# Patient Record
Sex: FEMALE | Race: BLACK OR AFRICAN AMERICAN | Employment: UNEMPLOYED | URBAN - METROPOLITAN AREA
[De-identification: names, ages, dates, MRNs, and addresses within clinical notes are randomized per-mention and may not be internally consistent; named-entity substitution may affect disease eponyms.]

---

## 2017-01-14 ENCOUNTER — ED HISTORICAL/CONVERTED ENCOUNTER (OUTPATIENT)
Dept: OTHER | Age: 37
End: 2017-01-14

## 2017-05-19 ENCOUNTER — ED HISTORICAL/CONVERTED ENCOUNTER (OUTPATIENT)
Dept: OTHER | Age: 37
End: 2017-05-19

## 2017-07-06 ENCOUNTER — OP HISTORICAL/CONVERTED ENCOUNTER (OUTPATIENT)
Dept: OTHER | Age: 37
End: 2017-07-06

## 2018-01-07 ENCOUNTER — ED HISTORICAL/CONVERTED ENCOUNTER (OUTPATIENT)
Dept: OTHER | Age: 38
End: 2018-01-07

## 2018-01-22 ENCOUNTER — OP HISTORICAL/CONVERTED ENCOUNTER (OUTPATIENT)
Dept: OTHER | Age: 38
End: 2018-01-22

## 2019-02-21 ENCOUNTER — HOSPITAL ENCOUNTER (OUTPATIENT)
Dept: PREADMISSION TESTING | Age: 39
Discharge: HOME | End: 2019-02-21
Payer: COMMERCIAL

## 2019-02-21 VITALS
WEIGHT: 260.38 LBS | HEIGHT: 70 IN | HEART RATE: 93 BPM | SYSTOLIC BLOOD PRESSURE: 142 MMHG | DIASTOLIC BLOOD PRESSURE: 81 MMHG | BODY MASS INDEX: 37.28 KG/M2 | RESPIRATION RATE: 20 BRPM | OXYGEN SATURATION: 98 % | TEMPERATURE: 97.4 F

## 2019-02-21 LAB
ABO + RH BLD: NORMAL
ANION GAP SERPL CALC-SCNC: 6 MMOL/L (ref 5–15)
BLOOD GROUP ANTIBODIES SERPL: NORMAL
BUN SERPL-MCNC: 5 MG/DL (ref 6–20)
BUN/CREAT SERPL: 6 (ref 12–20)
CALCIUM SERPL-MCNC: 8.9 MG/DL (ref 8.5–10.1)
CHLORIDE SERPL-SCNC: 106 MMOL/L (ref 97–108)
CO2 SERPL-SCNC: 29 MMOL/L (ref 21–32)
CREAT SERPL-MCNC: 0.87 MG/DL (ref 0.55–1.02)
ERYTHROCYTE [DISTWIDTH] IN BLOOD BY AUTOMATED COUNT: 14.5 % (ref 11.5–14.5)
GLUCOSE SERPL-MCNC: 322 MG/DL (ref 65–100)
HCG UR QL: NEGATIVE
HCT VFR BLD AUTO: 39.8 % (ref 35–47)
HGB BLD-MCNC: 12.5 G/DL (ref 11.5–16)
MCH RBC QN AUTO: 26.7 PG (ref 26–34)
MCHC RBC AUTO-ENTMCNC: 31.4 G/DL (ref 30–36.5)
MCV RBC AUTO: 84.9 FL (ref 80–99)
NRBC # BLD: 0 K/UL (ref 0–0.01)
NRBC BLD-RTO: 0 PER 100 WBC
PLATELET # BLD AUTO: 229 K/UL (ref 150–400)
PMV BLD AUTO: 10.6 FL (ref 8.9–12.9)
POTASSIUM SERPL-SCNC: 4.3 MMOL/L (ref 3.5–5.1)
RBC # BLD AUTO: 4.69 M/UL (ref 3.8–5.2)
SODIUM SERPL-SCNC: 141 MMOL/L (ref 136–145)
SPECIMEN EXP DATE BLD: NORMAL
WBC # BLD AUTO: 5.2 K/UL (ref 3.6–11)

## 2019-02-21 PROCEDURE — 85027 COMPLETE CBC AUTOMATED: CPT

## 2019-02-21 PROCEDURE — 36415 COLL VENOUS BLD VENIPUNCTURE: CPT

## 2019-02-21 PROCEDURE — 86900 BLOOD TYPING SEROLOGIC ABO: CPT

## 2019-02-21 PROCEDURE — 80048 BASIC METABOLIC PNL TOTAL CA: CPT

## 2019-02-21 PROCEDURE — 81025 URINE PREGNANCY TEST: CPT

## 2019-02-21 RX ORDER — CYCLOBENZAPRINE HCL 10 MG
10 TABLET ORAL
COMMUNITY
End: 2019-05-10 | Stop reason: ALTCHOICE

## 2019-02-21 RX ORDER — BISMUTH SUBSALICYLATE 262 MG
1 TABLET,CHEWABLE ORAL DAILY
COMMUNITY

## 2019-02-21 RX ORDER — GLIPIZIDE 5 MG/1
5 TABLET, FILM COATED, EXTENDED RELEASE ORAL DAILY
COMMUNITY
End: 2019-05-10 | Stop reason: ALTCHOICE

## 2019-02-21 NOTE — PERIOP NOTES
[address]                  
[address] [facility], [address] [facility] MAIN OR                                  [phone] MAIN PRE OP                          [phone]                                                                                AMBULATORY PRE OP          [phone] PRE-ADMISSION TESTING    21  Surgery Date:   03/13/2019 Is surgery arrival time given by surgeon? NO If [facility] staff will call you between 3 and 7pm the day before your surgery with your arrival time. (If your surgery is on a Monday, we will call you the Friday before.) Call [phone] after 7pm Monday-Friday if you did not receive your arrival time. INSTRUCTIONS BEFORE YOUR SURGERY When You 
Arrive Arrive at the 2nd [address] [facility] on the day of your surgery Have your insurance card, photo ID, and any copayment (if needed) Food 
 and  
Drink NO food or drink after midnight the night before surgery This means NO water, gum, mints, coffee, juice, etc. 
No alcohol (beer, wine, liquor) 24 hours before and after surgery Medications to TAKE Morning of Surgery MEDICATIONS TO TAKE THE MORNING OF SURGERY WITH A SIP OF WATER:  
? Cymbalta, lopressor, prilosec Medications To 
STOP      7 days before surgery ? Non-Steroidal anti-inflammatory Drugs (NSAID's): for example, Ibuprofen (Advil, Motrin), Naproxen (Aleve) ? Aspirin, if taking for pain ? Herbal supplements, vitamins, and fish oil 
? Other: 
(Pain medications not listed above, including Tylenol may be taken) Blood Thinners ? If you take  Aspirin, Plavix, Coumadin, or any blood-thinning or anti-blood clot medicine, talk to the doctor who prescribed the medications for pre-operative instructions. Bathing Clothing Jewelry Valuables ?   If you shower the morning of surgery, please do not apply anything to your skin (lotions, powders, deodorant, or makeup, especially mascara) ? Follow all special bath instructions (for total joint replacement, spine and bowel surgeries) ? Do not shave or trim anywhere 24 hours before surgery ? Wear your hair loose or down; no pony-tails, buns, or metal hair clips ? Wear loose, comfortable, clean clothes ? Wear glasses instead of contacts ? Leave money, valuables, and jewelry, including body piercings, at home Going Home       or Spending the Night ? SAME-DAY SURGERY: You must have a responsible adult drive you home and stay with you 24 hours after surgery ? ADMITS: If your doctor is keeping you into the hospital after surgery, leave personal belongings/luggage in your car until you have a hospital room number. Hospital discharge time is 12 noon Drivers must be here before 12 noon unless you are told differently Special Instructions Free  Parking Follow all instructions so your surgery wont be cancelled. Please, be on time. If a situation occurs and you are delayed the day of surgery, call   [phone]. If your physical condition changes (like a fever, cold, flu, etc.) call your surgeon. The patient was contacted  in person. Home medication reviewed and verified during PAT appointment. The patient verbalizes understanding of all instructions and does not  need reinforcement.

## 2019-03-12 ENCOUNTER — ANESTHESIA EVENT (OUTPATIENT)
Dept: SURGERY | Age: 39
End: 2019-03-12
Payer: MEDICAID

## 2019-03-13 ENCOUNTER — HOSPITAL ENCOUNTER (OUTPATIENT)
Age: 39
Setting detail: OBSERVATION
Discharge: HOME | End: 2019-03-14
Attending: STUDENT IN AN ORGANIZED HEALTH CARE EDUCATION/TRAINING PROGRAM | Admitting: STUDENT IN AN ORGANIZED HEALTH CARE EDUCATION/TRAINING PROGRAM
Payer: MEDICAID

## 2019-03-13 ENCOUNTER — ANESTHESIA (OUTPATIENT)
Dept: SURGERY | Age: 39
End: 2019-03-13
Payer: MEDICAID

## 2019-03-13 DIAGNOSIS — E89.40 SURGICAL MENOPAUSE: ICD-10-CM

## 2019-03-13 DIAGNOSIS — G89.18 POSTOPERATIVE PAIN: Primary | ICD-10-CM

## 2019-03-13 PROBLEM — R10.2 PELVIC PAIN: Status: ACTIVE | Noted: 2019-03-13

## 2019-03-13 PROBLEM — N93.9 ABNORMAL UTERINE BLEEDING: Status: ACTIVE | Noted: 2019-03-13

## 2019-03-13 LAB
ABO + RH BLD: NORMAL
BLOOD GROUP ANTIBODIES SERPL: NORMAL
GLUCOSE BLD STRIP.AUTO-MCNC: 147 MG/DL (ref 65–100)
GLUCOSE BLD STRIP.AUTO-MCNC: 186 MG/DL (ref 65–100)
GLUCOSE BLD STRIP.AUTO-MCNC: 198 MG/DL (ref 65–100)
GLUCOSE BLD STRIP.AUTO-MCNC: 243 MG/DL (ref 65–100)
GLUCOSE BLD STRIP.AUTO-MCNC: 293 MG/DL (ref 65–100)
GLUCOSE BLD STRIP.AUTO-MCNC: 334 MG/DL (ref 65–100)
HCG UR QL: NEGATIVE
SERVICE CMNT-IMP: ABNORMAL
SPECIMEN EXP DATE BLD: NORMAL

## 2019-03-13 PROCEDURE — 77030018836 HC SOL IRR NACL ICUM -A: Performed by: STUDENT IN AN ORGANIZED HEALTH CARE EDUCATION/TRAINING PROGRAM

## 2019-03-13 PROCEDURE — 77030026438 HC STYL ET INTUB CARD -A: Performed by: NURSE ANESTHETIST, CERTIFIED REGISTERED

## 2019-03-13 PROCEDURE — 74011250637 HC RX REV CODE- 250/637: Performed by: INTERNAL MEDICINE

## 2019-03-13 PROCEDURE — 74011000250 HC RX REV CODE- 250: Performed by: STUDENT IN AN ORGANIZED HEALTH CARE EDUCATION/TRAINING PROGRAM

## 2019-03-13 PROCEDURE — 76210000017 HC OR PH I REC 1.5 TO 2 HR: Performed by: STUDENT IN AN ORGANIZED HEALTH CARE EDUCATION/TRAINING PROGRAM

## 2019-03-13 PROCEDURE — 76060000035 HC ANESTHESIA 2 TO 2.5 HR: Performed by: STUDENT IN AN ORGANIZED HEALTH CARE EDUCATION/TRAINING PROGRAM

## 2019-03-13 PROCEDURE — 88307 TISSUE EXAM BY PATHOLOGIST: CPT

## 2019-03-13 PROCEDURE — 77030035029 HC NDL INSUF VERES DISP COVD -B: Performed by: STUDENT IN AN ORGANIZED HEALTH CARE EDUCATION/TRAINING PROGRAM

## 2019-03-13 PROCEDURE — 77030035048 HC TRCR ENDOSC OPTCL COVD -B: Performed by: STUDENT IN AN ORGANIZED HEALTH CARE EDUCATION/TRAINING PROGRAM

## 2019-03-13 PROCEDURE — 77030020782 HC GWN BAIR PAWS FLX 3M -B

## 2019-03-13 PROCEDURE — 74011250636 HC RX REV CODE- 250/636: Performed by: STUDENT IN AN ORGANIZED HEALTH CARE EDUCATION/TRAINING PROGRAM

## 2019-03-13 PROCEDURE — 77030035045 HC TRCR ENDOSC VRSPRT BLDLSS COVD -B: Performed by: STUDENT IN AN ORGANIZED HEALTH CARE EDUCATION/TRAINING PROGRAM

## 2019-03-13 PROCEDURE — 74011250636 HC RX REV CODE- 250/636

## 2019-03-13 PROCEDURE — 74011000250 HC RX REV CODE- 250

## 2019-03-13 PROCEDURE — 77030034850: Performed by: STUDENT IN AN ORGANIZED HEALTH CARE EDUCATION/TRAINING PROGRAM

## 2019-03-13 PROCEDURE — 99218 HC RM OBSERVATION: CPT

## 2019-03-13 PROCEDURE — 77030002933 HC SUT MCRYL J&J -A: Performed by: STUDENT IN AN ORGANIZED HEALTH CARE EDUCATION/TRAINING PROGRAM

## 2019-03-13 PROCEDURE — 77030037892: Performed by: STUDENT IN AN ORGANIZED HEALTH CARE EDUCATION/TRAINING PROGRAM

## 2019-03-13 PROCEDURE — 77030032490 HC SLV COMPR SCD KNE COVD -B: Performed by: STUDENT IN AN ORGANIZED HEALTH CARE EDUCATION/TRAINING PROGRAM

## 2019-03-13 PROCEDURE — 77030031139 HC SUT VCRL2 J&J -A: Performed by: STUDENT IN AN ORGANIZED HEALTH CARE EDUCATION/TRAINING PROGRAM

## 2019-03-13 PROCEDURE — 77030008756 HC TU IRR SUC STRY -B: Performed by: STUDENT IN AN ORGANIZED HEALTH CARE EDUCATION/TRAINING PROGRAM

## 2019-03-13 PROCEDURE — 77030018778 HC MANIP UTER VCAR CNMD -B: Performed by: STUDENT IN AN ORGANIZED HEALTH CARE EDUCATION/TRAINING PROGRAM

## 2019-03-13 PROCEDURE — 74011250636 HC RX REV CODE- 250/636: Performed by: ANESTHESIOLOGY

## 2019-03-13 PROCEDURE — 77030019908 HC STETH ESOPH SIMS -A: Performed by: NURSE ANESTHETIST, CERTIFIED REGISTERED

## 2019-03-13 PROCEDURE — 77030008771 HC TU NG SALEM SUMP -A: Performed by: NURSE ANESTHETIST, CERTIFIED REGISTERED

## 2019-03-13 PROCEDURE — 74011250637 HC RX REV CODE- 250/637: Performed by: STUDENT IN AN ORGANIZED HEALTH CARE EDUCATION/TRAINING PROGRAM

## 2019-03-13 PROCEDURE — 77030035051: Performed by: STUDENT IN AN ORGANIZED HEALTH CARE EDUCATION/TRAINING PROGRAM

## 2019-03-13 PROCEDURE — 81025 URINE PREGNANCY TEST: CPT

## 2019-03-13 PROCEDURE — 74011636637 HC RX REV CODE- 636/637: Performed by: STUDENT IN AN ORGANIZED HEALTH CARE EDUCATION/TRAINING PROGRAM

## 2019-03-13 PROCEDURE — 77030013079 HC BLNKT BAIR HGGR 3M -A: Performed by: NURSE ANESTHETIST, CERTIFIED REGISTERED

## 2019-03-13 PROCEDURE — 76010000131 HC OR TIME 2 TO 2.5 HR: Performed by: STUDENT IN AN ORGANIZED HEALTH CARE EDUCATION/TRAINING PROGRAM

## 2019-03-13 PROCEDURE — 77030011640 HC PAD GRND REM COVD -A: Performed by: STUDENT IN AN ORGANIZED HEALTH CARE EDUCATION/TRAINING PROGRAM

## 2019-03-13 PROCEDURE — 77030009848 HC PASSR SUT SET COOP -C: Performed by: STUDENT IN AN ORGANIZED HEALTH CARE EDUCATION/TRAINING PROGRAM

## 2019-03-13 PROCEDURE — 77030008684 HC TU ET CUF COVD -B: Performed by: NURSE ANESTHETIST, CERTIFIED REGISTERED

## 2019-03-13 PROCEDURE — 82962 GLUCOSE BLOOD TEST: CPT

## 2019-03-13 PROCEDURE — 74011636637 HC RX REV CODE- 636/637: Performed by: ANESTHESIOLOGY

## 2019-03-13 PROCEDURE — 77030039266 HC ADH SKN EXOFIN S2SG -A: Performed by: STUDENT IN AN ORGANIZED HEALTH CARE EDUCATION/TRAINING PROGRAM

## 2019-03-13 PROCEDURE — 77030034154 HC SHR COAG HARM ACE J&J -F: Performed by: STUDENT IN AN ORGANIZED HEALTH CARE EDUCATION/TRAINING PROGRAM

## 2019-03-13 PROCEDURE — 36415 COLL VENOUS BLD VENIPUNCTURE: CPT

## 2019-03-13 PROCEDURE — 86900 BLOOD TYPING SEROLOGIC ABO: CPT

## 2019-03-13 RX ORDER — ATORVASTATIN CALCIUM 20 MG/1
20 TABLET, FILM COATED ORAL
Status: DISCONTINUED | OUTPATIENT
Start: 2019-03-13 | End: 2019-03-14 | Stop reason: HOSPADM

## 2019-03-13 RX ORDER — SODIUM CHLORIDE 0.9 % (FLUSH) 0.9 %
5-40 SYRINGE (ML) INJECTION AS NEEDED
Status: DISCONTINUED | OUTPATIENT
Start: 2019-03-13 | End: 2019-03-13 | Stop reason: HOSPADM

## 2019-03-13 RX ORDER — MAGNESIUM SULFATE 100 %
4 CRYSTALS MISCELLANEOUS AS NEEDED
Status: DISCONTINUED | OUTPATIENT
Start: 2019-03-13 | End: 2019-03-14 | Stop reason: HOSPADM

## 2019-03-13 RX ORDER — ONDANSETRON 2 MG/ML
4 INJECTION INTRAMUSCULAR; INTRAVENOUS AS NEEDED
Status: DISCONTINUED | OUTPATIENT
Start: 2019-03-13 | End: 2019-03-13 | Stop reason: HOSPADM

## 2019-03-13 RX ORDER — METOPROLOL TARTRATE 50 MG/1
100 TABLET ORAL 2 TIMES DAILY
Status: DISCONTINUED | OUTPATIENT
Start: 2019-03-13 | End: 2019-03-14 | Stop reason: HOSPADM

## 2019-03-13 RX ORDER — MIDAZOLAM HYDROCHLORIDE 1 MG/ML
INJECTION, SOLUTION INTRAMUSCULAR; INTRAVENOUS AS NEEDED
Status: DISCONTINUED | OUTPATIENT
Start: 2019-03-13 | End: 2019-03-13 | Stop reason: HOSPADM

## 2019-03-13 RX ORDER — GLIPIZIDE 5 MG/1
5 TABLET, FILM COATED, EXTENDED RELEASE ORAL DAILY
Status: DISCONTINUED | OUTPATIENT
Start: 2019-03-13 | End: 2019-03-14 | Stop reason: HOSPADM

## 2019-03-13 RX ORDER — CEFAZOLIN SODIUM/WATER 2 G/20 ML
2 SYRINGE (ML) INTRAVENOUS ONCE
Status: COMPLETED | OUTPATIENT
Start: 2019-03-13 | End: 2019-03-13

## 2019-03-13 RX ORDER — GLIPIZIDE 5 MG/1
5 TABLET, FILM COATED, EXTENDED RELEASE ORAL DAILY
Status: DISCONTINUED | OUTPATIENT
Start: 2019-03-14 | End: 2019-03-13

## 2019-03-13 RX ORDER — LIDOCAINE HYDROCHLORIDE 20 MG/ML
INJECTION, SOLUTION EPIDURAL; INFILTRATION; INTRACAUDAL; PERINEURAL AS NEEDED
Status: DISCONTINUED | OUTPATIENT
Start: 2019-03-13 | End: 2019-03-13 | Stop reason: HOSPADM

## 2019-03-13 RX ORDER — ONDANSETRON 2 MG/ML
INJECTION INTRAMUSCULAR; INTRAVENOUS AS NEEDED
Status: DISCONTINUED | OUTPATIENT
Start: 2019-03-13 | End: 2019-03-13 | Stop reason: HOSPADM

## 2019-03-13 RX ORDER — FAMOTIDINE 10 MG/ML
INJECTION INTRAVENOUS AS NEEDED
Status: DISCONTINUED | OUTPATIENT
Start: 2019-03-13 | End: 2019-03-13 | Stop reason: HOSPADM

## 2019-03-13 RX ORDER — SODIUM CHLORIDE, SODIUM LACTATE, POTASSIUM CHLORIDE, CALCIUM CHLORIDE 600; 310; 30; 20 MG/100ML; MG/100ML; MG/100ML; MG/100ML
100 INJECTION, SOLUTION INTRAVENOUS CONTINUOUS
Status: DISCONTINUED | OUTPATIENT
Start: 2019-03-13 | End: 2019-03-13 | Stop reason: HOSPADM

## 2019-03-13 RX ORDER — FAMOTIDINE 20 MG/1
40 TABLET, FILM COATED ORAL DAILY
Status: DISCONTINUED | OUTPATIENT
Start: 2019-03-14 | End: 2019-03-14 | Stop reason: HOSPADM

## 2019-03-13 RX ORDER — HYDROMORPHONE HYDROCHLORIDE 1 MG/ML
.25-1 INJECTION, SOLUTION INTRAMUSCULAR; INTRAVENOUS; SUBCUTANEOUS
Status: DISCONTINUED | OUTPATIENT
Start: 2019-03-13 | End: 2019-03-13 | Stop reason: HOSPADM

## 2019-03-13 RX ORDER — BUPIVACAINE HYDROCHLORIDE 5 MG/ML
INJECTION, SOLUTION EPIDURAL; INTRACAUDAL AS NEEDED
Status: DISCONTINUED | OUTPATIENT
Start: 2019-03-13 | End: 2019-03-13 | Stop reason: HOSPADM

## 2019-03-13 RX ORDER — ONDANSETRON 2 MG/ML
4 INJECTION INTRAMUSCULAR; INTRAVENOUS
Status: DISCONTINUED | OUTPATIENT
Start: 2019-03-13 | End: 2019-03-14 | Stop reason: HOSPADM

## 2019-03-13 RX ORDER — HYDROMORPHONE HYDROCHLORIDE 2 MG/ML
1 INJECTION, SOLUTION INTRAMUSCULAR; INTRAVENOUS; SUBCUTANEOUS
Status: DISCONTINUED | OUTPATIENT
Start: 2019-03-13 | End: 2019-03-14 | Stop reason: HOSPADM

## 2019-03-13 RX ORDER — SODIUM CHLORIDE, SODIUM LACTATE, POTASSIUM CHLORIDE, CALCIUM CHLORIDE 600; 310; 30; 20 MG/100ML; MG/100ML; MG/100ML; MG/100ML
75 INJECTION, SOLUTION INTRAVENOUS CONTINUOUS
Status: DISCONTINUED | OUTPATIENT
Start: 2019-03-13 | End: 2019-03-13 | Stop reason: HOSPADM

## 2019-03-13 RX ORDER — DIPHENHYDRAMINE HYDROCHLORIDE 50 MG/ML
12.5 INJECTION, SOLUTION INTRAMUSCULAR; INTRAVENOUS AS NEEDED
Status: DISCONTINUED | OUTPATIENT
Start: 2019-03-13 | End: 2019-03-13 | Stop reason: HOSPADM

## 2019-03-13 RX ORDER — KETOROLAC TROMETHAMINE 30 MG/ML
INJECTION, SOLUTION INTRAMUSCULAR; INTRAVENOUS AS NEEDED
Status: DISCONTINUED | OUTPATIENT
Start: 2019-03-13 | End: 2019-03-13 | Stop reason: HOSPADM

## 2019-03-13 RX ORDER — SODIUM CHLORIDE 0.9 % (FLUSH) 0.9 %
5-40 SYRINGE (ML) INJECTION EVERY 8 HOURS
Status: DISCONTINUED | OUTPATIENT
Start: 2019-03-13 | End: 2019-03-13 | Stop reason: HOSPADM

## 2019-03-13 RX ORDER — SODIUM CHLORIDE, SODIUM LACTATE, POTASSIUM CHLORIDE, CALCIUM CHLORIDE 600; 310; 30; 20 MG/100ML; MG/100ML; MG/100ML; MG/100ML
125 INJECTION, SOLUTION INTRAVENOUS CONTINUOUS
Status: DISCONTINUED | OUTPATIENT
Start: 2019-03-13 | End: 2019-03-13 | Stop reason: HOSPADM

## 2019-03-13 RX ORDER — SODIUM CHLORIDE, SODIUM LACTATE, POTASSIUM CHLORIDE, CALCIUM CHLORIDE 600; 310; 30; 20 MG/100ML; MG/100ML; MG/100ML; MG/100ML
125 INJECTION, SOLUTION INTRAVENOUS CONTINUOUS
Status: DISCONTINUED | OUTPATIENT
Start: 2019-03-13 | End: 2019-03-14 | Stop reason: HOSPADM

## 2019-03-13 RX ORDER — DULOXETIN HYDROCHLORIDE 30 MG/1
60 CAPSULE, DELAYED RELEASE ORAL 2 TIMES DAILY
Status: DISCONTINUED | OUTPATIENT
Start: 2019-03-13 | End: 2019-03-14 | Stop reason: HOSPADM

## 2019-03-13 RX ORDER — GLYCOPYRROLATE 0.2 MG/ML
INJECTION INTRAMUSCULAR; INTRAVENOUS AS NEEDED
Status: DISCONTINUED | OUTPATIENT
Start: 2019-03-13 | End: 2019-03-13 | Stop reason: HOSPADM

## 2019-03-13 RX ORDER — HYDROCODONE BITARTRATE AND ACETAMINOPHEN 7.5; 325 MG/1; MG/1
1 TABLET ORAL
Status: DISCONTINUED | OUTPATIENT
Start: 2019-03-13 | End: 2019-03-14 | Stop reason: HOSPADM

## 2019-03-13 RX ORDER — SUCCINYLCHOLINE CHLORIDE 20 MG/ML
INJECTION INTRAMUSCULAR; INTRAVENOUS AS NEEDED
Status: DISCONTINUED | OUTPATIENT
Start: 2019-03-13 | End: 2019-03-13 | Stop reason: HOSPADM

## 2019-03-13 RX ORDER — INSULIN LISPRO 100 [IU]/ML
INJECTION, SOLUTION INTRAVENOUS; SUBCUTANEOUS
Status: DISCONTINUED | OUTPATIENT
Start: 2019-03-13 | End: 2019-03-14 | Stop reason: HOSPADM

## 2019-03-13 RX ORDER — NEOSTIGMINE METHYLSULFATE 1 MG/ML
INJECTION INTRAVENOUS AS NEEDED
Status: DISCONTINUED | OUTPATIENT
Start: 2019-03-13 | End: 2019-03-13 | Stop reason: HOSPADM

## 2019-03-13 RX ORDER — ROCURONIUM BROMIDE 10 MG/ML
INJECTION, SOLUTION INTRAVENOUS AS NEEDED
Status: DISCONTINUED | OUTPATIENT
Start: 2019-03-13 | End: 2019-03-13 | Stop reason: HOSPADM

## 2019-03-13 RX ORDER — SODIUM CHLORIDE 0.9 % (FLUSH) 0.9 %
5-40 SYRINGE (ML) INJECTION AS NEEDED
Status: DISCONTINUED | OUTPATIENT
Start: 2019-03-13 | End: 2019-03-14

## 2019-03-13 RX ORDER — DULOXETIN HYDROCHLORIDE 30 MG/1
60 CAPSULE, DELAYED RELEASE ORAL DAILY
Status: DISCONTINUED | OUTPATIENT
Start: 2019-03-13 | End: 2019-03-13

## 2019-03-13 RX ORDER — DIPHENHYDRAMINE HYDROCHLORIDE 50 MG/ML
12.5 INJECTION, SOLUTION INTRAMUSCULAR; INTRAVENOUS
Status: DISCONTINUED | OUTPATIENT
Start: 2019-03-13 | End: 2019-03-14 | Stop reason: HOSPADM

## 2019-03-13 RX ORDER — PROPOFOL 10 MG/ML
INJECTION, EMULSION INTRAVENOUS AS NEEDED
Status: DISCONTINUED | OUTPATIENT
Start: 2019-03-13 | End: 2019-03-13 | Stop reason: HOSPADM

## 2019-03-13 RX ORDER — SODIUM CHLORIDE 0.9 % (FLUSH) 0.9 %
5-40 SYRINGE (ML) INJECTION EVERY 8 HOURS
Status: DISCONTINUED | OUTPATIENT
Start: 2019-03-13 | End: 2019-03-14

## 2019-03-13 RX ORDER — LIDOCAINE HYDROCHLORIDE 10 MG/ML
0.1 INJECTION, SOLUTION EPIDURAL; INFILTRATION; INTRACAUDAL; PERINEURAL AS NEEDED
Status: DISCONTINUED | OUTPATIENT
Start: 2019-03-13 | End: 2019-03-13 | Stop reason: HOSPADM

## 2019-03-13 RX ORDER — FENTANYL CITRATE 50 UG/ML
INJECTION, SOLUTION INTRAMUSCULAR; INTRAVENOUS AS NEEDED
Status: DISCONTINUED | OUTPATIENT
Start: 2019-03-13 | End: 2019-03-13 | Stop reason: HOSPADM

## 2019-03-13 RX ORDER — KETOROLAC TROMETHAMINE 30 MG/ML
30 INJECTION, SOLUTION INTRAMUSCULAR; INTRAVENOUS EVERY 8 HOURS
Status: DISCONTINUED | OUTPATIENT
Start: 2019-03-13 | End: 2019-03-14 | Stop reason: HOSPADM

## 2019-03-13 RX ORDER — DOCUSATE SODIUM 100 MG/1
100 CAPSULE, LIQUID FILLED ORAL 2 TIMES DAILY
Status: DISCONTINUED | OUTPATIENT
Start: 2019-03-13 | End: 2019-03-14 | Stop reason: HOSPADM

## 2019-03-13 RX ORDER — DEXTROSE 50 % IN WATER (D50W) INTRAVENOUS SYRINGE
12.5-25 AS NEEDED
Status: DISCONTINUED | OUTPATIENT
Start: 2019-03-13 | End: 2019-03-14 | Stop reason: HOSPADM

## 2019-03-13 RX ADMIN — FAMOTIDINE 20 MG: 10 INJECTION INTRAVENOUS at 07:42

## 2019-03-13 RX ADMIN — MIDAZOLAM HYDROCHLORIDE 3 MG: 1 INJECTION, SOLUTION INTRAMUSCULAR; INTRAVENOUS at 07:39

## 2019-03-13 RX ADMIN — ROCURONIUM BROMIDE 10 MG: 10 INJECTION, SOLUTION INTRAVENOUS at 07:49

## 2019-03-13 RX ADMIN — INSULIN HUMAN 10 UNITS: 100 INJECTION, SOLUTION PARENTERAL at 10:50

## 2019-03-13 RX ADMIN — ATORVASTATIN CALCIUM 20 MG: 20 TABLET, FILM COATED ORAL at 21:08

## 2019-03-13 RX ADMIN — FENTANYL CITRATE 50 MCG: 50 INJECTION, SOLUTION INTRAMUSCULAR; INTRAVENOUS at 08:10

## 2019-03-13 RX ADMIN — GLIPIZIDE 5 MG: 5 TABLET, FILM COATED, EXTENDED RELEASE ORAL at 14:21

## 2019-03-13 RX ADMIN — SODIUM CHLORIDE, POTASSIUM CHLORIDE, SODIUM LACTATE AND CALCIUM CHLORIDE: 600; 310; 30; 20 INJECTION, SOLUTION INTRAVENOUS at 09:00

## 2019-03-13 RX ADMIN — SODIUM CHLORIDE, POTASSIUM CHLORIDE, SODIUM LACTATE AND CALCIUM CHLORIDE: 600; 310; 30; 20 INJECTION, SOLUTION INTRAVENOUS at 07:42

## 2019-03-13 RX ADMIN — FENTANYL CITRATE 50 MCG: 50 INJECTION, SOLUTION INTRAMUSCULAR; INTRAVENOUS at 09:44

## 2019-03-13 RX ADMIN — DULOXETINE HYDROCHLORIDE 60 MG: 30 CAPSULE, DELAYED RELEASE ORAL at 18:18

## 2019-03-13 RX ADMIN — SODIUM CHLORIDE, SODIUM LACTATE, POTASSIUM CHLORIDE, AND CALCIUM CHLORIDE 125 ML/HR: 600; 310; 30; 20 INJECTION, SOLUTION INTRAVENOUS at 18:41

## 2019-03-13 RX ADMIN — ROCURONIUM BROMIDE 10 MG: 10 INJECTION, SOLUTION INTRAVENOUS at 08:34

## 2019-03-13 RX ADMIN — SUCCINYLCHOLINE CHLORIDE 140 MG: 20 INJECTION INTRAMUSCULAR; INTRAVENOUS at 07:48

## 2019-03-13 RX ADMIN — INSULIN LISPRO 2 UNITS: 100 INJECTION, SOLUTION INTRAVENOUS; SUBCUTANEOUS at 17:30

## 2019-03-13 RX ADMIN — LIDOCAINE HYDROCHLORIDE 60 MG: 20 INJECTION, SOLUTION EPIDURAL; INFILTRATION; INTRACAUDAL; PERINEURAL at 07:49

## 2019-03-13 RX ADMIN — Medication 2 G: at 08:00

## 2019-03-13 RX ADMIN — HYDROMORPHONE HYDROCHLORIDE 1 MG: 1 INJECTION, SOLUTION INTRAMUSCULAR; INTRAVENOUS; SUBCUTANEOUS at 10:33

## 2019-03-13 RX ADMIN — FENTANYL CITRATE 50 MCG: 50 INJECTION, SOLUTION INTRAMUSCULAR; INTRAVENOUS at 07:42

## 2019-03-13 RX ADMIN — FENTANYL CITRATE 50 MCG: 50 INJECTION, SOLUTION INTRAMUSCULAR; INTRAVENOUS at 09:22

## 2019-03-13 RX ADMIN — GLYCOPYRROLATE 0.5 MG: 0.2 INJECTION INTRAMUSCULAR; INTRAVENOUS at 09:43

## 2019-03-13 RX ADMIN — DOCUSATE SODIUM 100 MG: 100 CAPSULE, LIQUID FILLED ORAL at 18:19

## 2019-03-13 RX ADMIN — ONDANSETRON 4 MG: 2 INJECTION INTRAMUSCULAR; INTRAVENOUS at 09:43

## 2019-03-13 RX ADMIN — FENTANYL CITRATE 50 MCG: 50 INJECTION, SOLUTION INTRAMUSCULAR; INTRAVENOUS at 08:19

## 2019-03-13 RX ADMIN — KETOROLAC TROMETHAMINE 30 MG: 30 INJECTION, SOLUTION INTRAMUSCULAR; INTRAVENOUS at 18:29

## 2019-03-13 RX ADMIN — PROPOFOL 200 MG: 10 INJECTION, EMULSION INTRAVENOUS at 07:48

## 2019-03-13 RX ADMIN — KETOROLAC TROMETHAMINE 30 MG: 30 INJECTION, SOLUTION INTRAMUSCULAR; INTRAVENOUS at 09:43

## 2019-03-13 RX ADMIN — ROCURONIUM BROMIDE 20 MG: 10 INJECTION, SOLUTION INTRAVENOUS at 08:07

## 2019-03-13 RX ADMIN — METOPROLOL TARTRATE 100 MG: 50 TABLET ORAL at 18:19

## 2019-03-13 RX ADMIN — NEOSTIGMINE METHYLSULFATE 3 MG: 1 INJECTION INTRAVENOUS at 09:43

## 2019-03-13 NOTE — CONSULTS
Consult History and Physical Exam    NAME:  Dima Farrell   :   1980   MRN:  [id number]     PCP:  Che Arriola MD   Referring: Rene Tolliver DO     Date/Time:  3/13/2019         Subjective:   REASON FOR CONSULT: diabetes    CHIEF COMPLAINT: I feel good     HISTORY OF PRESENT ILLNESS:     Ms. Renu Rodas is a 34 y.o. with h/o hld, dm, svt who presents for hysterectomy. Pt is doing well post procedure, still somewhat sleepy from anesthesia. She states she is 'borderline' for diabetes and was recently placed back on medication. She did not take her glipizide this morning.  States her BG is usually well controlled      Past Medical History:   Diagnosis Date    AR (allergic rhinitis)     Autoimmune disease ([address]     fibromyalgia    Carpal tunnel syndrome     Diabetes ([address]     Eczema     Endometriosis     Essential hypertension     GERD (gastroesophageal reflux disease)     Heartburn     Hyperlipidemia     Ill-defined condition     peripheral neuropathy    Ill-defined condition     cyst on spine    PCOS (polycystic ovarian syndrome)     Psychiatric disorder     depression    PUD (peptic ulcer disease)     Scoliosis         Past Surgical History:   Procedure Laterality Date    BIOPSY OF KIDNEY,OPEN EXPOS      HX ORTHOPAEDIC  1994    Spinal fusion    HX OTHER SURGICAL      wisdom teeth extraction       Social History     Tobacco Use    Smoking status: Never Smoker    Smokeless tobacco: Never Used   Substance Use Topics    Alcohol use: No        Family History   Problem Relation Age of Onset    Asthma Sister     Cancer Maternal Grandmother         breast    Diabetes Mother     Hypertension Father     High Cholesterol Father         Allergies   Allergen Reactions    Atenolol Other (comments)     Muscle weakness      Bactrim [Sulfamethoxazole-Trimethoprim] Hives    Lisinopril Hives    Omnicef [Cefdinir] Diarrhea    Toprol Xl [Metoprolol Succinate] Other (comments) nightmares        Prior to Admission medications    Medication Sig Start Date End Date Taking? Authorizing Provider   metoprolol tartrate (LOPRESSOR) 100 mg IR tablet Take  by mouth two (2) times a day. Yes Provider, Historical   atorvastatin (LIPITOR) 20 mg tablet Take 20 mg by mouth nightly. Yes Provider, Historical   glipiZIDE SR (GLUCOTROL XL) 5 mg CR tablet Take 5 mg by mouth daily. Provider, Historical   cyclobenzaprine (FLEXERIL) 10 mg tablet Take 10 mg by mouth nightly. Provider, Historical   multivitamin (ONE DAILY) tablet Take 1 Tab by mouth daily. Provider, Historical   DULoxetine (CYMBALTA) 60 mg capsule Take 60 mg by mouth daily. Provider, Historical   magnesium 250 mg tab Take 250 mg by mouth daily. Provider, Historical   topiramate (TOPAMAX) 50 mg tablet Take 100 mg by mouth two (2) times a day. Provider, Historical   traMADol-acetaminophen (ULTRACET) 37.5-325 mg per tablet Take 2 Tabs by mouth every six (6) hours as needed for Pain. Provider, Historical   CALCIUM CITRATE-VITAMIN D3 PO Take 1 Tab by mouth two (2) times a day. Vitamin d 3 500 mg and calcium 400 mg    Provider, Historical   Alpha Lipoic Acid 200 mg tab Take 1,200 mg by mouth daily. Provider, Historical   triamcinolone acetonide (KENALOG) 0.1 % topical cream Apply  to affected area two (2) times a day. use thin layer    Provider, Historical   Milnacipran (SAVELLA) 50 mg tablet Take 50 mg by mouth two (2) times a day. Provider, Historical   omeprazole (PRILOSEC) 20 mg capsule Take 40 mg by mouth daily. Provider, Historical   senna (SENOKOT) 8.6 mg tablet Take 1-2 Tabs by mouth as needed. Provider, Historical   acetaminophen (TYLENOL) 500 mg tablet Take 500 mg by mouth every six (6) hours as needed for Pain. Provider, Historical   loratadine (CLARITIN) 10 mg tablet Take 10 mg by mouth daily.     Provider, Historical   fluticasone (FLONASE) 50 mcg/actuation nasal spray 2 Sprays by Both Nostrils route once.    Provider, Historical   tretinoin (RETIN-A) 0.025 % topical cream Apply  to affected area nightly. Provider, Historical   clindamycin (CLEOCIN T) 1 % lotion Apply  to affected area two (2) times a day. use thin film on affected area    Provider, Historical         Review of Systems:  (bold if positive, if negative)    Gen:  Eyes:  ENT:  CVS:  Pulm:  GI:    :    MS:  Skin:  Psych:  Endo:    Hem:  Renal:    Neuro:            Objective:      VITALS:    Vital signs reviewed; most recent are:    Visit Vitals  /64 (BP 1 Location: Left arm, BP Patient Position: At rest)   Pulse 97   Temp 97.9 °F (36.6 °C)   Resp 18   Ht 5' 10\" (1.778 m)   Wt 117.5 kg (259 lb 0.7 oz)   SpO2 98%   BMI 37.17 kg/m²     SpO2 Readings from Last 6 Encounters:   03/13/19 98%   02/21/19 98%   12/02/16 98%   10/21/16 98%   03/16/16 98%   05/27/12 99%    O2 Flow Rate (L/min): 2 l/min       Intake/Output Summary (Last 24 hours) at 3/13/2019 1344  Last data filed at 3/13/2019 1126  Gross per 24 hour   Intake 1900 ml   Output 385 ml   Net 1515 ml            Exam:     Physical Exam:    Gen:  Well-developed, well-nourished, in no acute distress  HEENT:  Pink conjunctivae, PERRL, hearing intact to voice, moist mucous membranes  Neck:  Supple, without masses, thyroid non-tender  Resp:  No accessory muscle use, clear breath sounds without wheezes rales or rhonchi  Card:  No murmurs, normal S1, S2 without thrills, bruits or peripheral edema  Abd:  Soft, non-tender, non-distended, normoactive bowel sounds are present, no palpable organomegaly  Lymph:  No cervical adenopathy  Musc:  No cyanosis or clubbing  Skin:  No rashes or ulcers, skin turgor is good  Neuro:  Cranial nerves 3-12 are grossly intact,  strength is 5/5 bilaterally, dorsi / plantarflexion strength is 5/5 bilaterally, follows commands appropriately  Psych:  Alert with good insight.   Oriented to person, place, and time       Labs:    No results for input(s): WBC, HGB, HCT, PLT, HGBEXT, HCTEXT, PLTEXT in the last 72 hours. No results for input(s): NA, K, CL, CO2, GLU, BUN, CREA, CA, MG, PHOS, ALB, TBIL, SGOT, ALT in the last 72 hours. No components found for: GLPOC    No results for input(s): INR in the last 72 hours. No lab exists for component: INREXT       Assessment/Plan:       Type 2 diabetes mellitus: BG elevated today likely due to missed dose of glipizide. Order now dose as pt will be eating this evening. Continue SSI. Send A1c    H/o SVT: resume metoprolol    Hyperlipidemia: resume lipitor      Abnormal uterine bleeding: sp hysterectomy.  OBGYN is primary team and with manage dvt ppx, pt/ot, pain control          Total time spent with patient: 48 [facility] discussed with: Patient    Discussed:  Care Plan             ___________________________________________________    Attending Physician: Dow Sandhoff, MD

## 2019-03-13 NOTE — PROGRESS NOTES
Bedside shift change report given to Jami Pardo (oncoming nurse) by Jessica Day RN (offgoing nurse). Report included the following information SBAR, Kardex, Intake/Output, MAR and Recent Results.

## 2019-03-13 NOTE — BRIEF OP NOTE
BRIEF OPERATIVE NOTE    Date of Procedure: 3/13/2019   Preoperative Diagnosis: PELVIC PAIN  ENDOMETRIOSIS  Postoperative Diagnosis: PELVIC PAIN  ENDOMETRIOSIS    Procedure(s):  TOTAL LAPAROSCOPIC HYSTERECTOMY, BILATERAL SALPINGOOPHERECTOMY, LYSIS OF ADHESIONS. Surgeon(s) and Role:     * Ruben Chaudhary DO - Primary     * Kenyon Vaughn MD - Assisting         Surgical Assistant: None    Surgical Staff:  Circ-1: Rhett Leone RN  Circ-2: Juliana Barbour  Scrub Tech-1: Jenaro AQUINO  Scrub Tech-Relief: Desiree Dunne  Event Time In Time Out   Incision Start 6589    Incision Close 0954      Anesthesia: General   Estimated Blood Loss: 200cc, UOP 125cc, IVF 1500cc crystalloid   Specimens:   ID Type Source Tests Collected by Time Destination   1 : uterus, cervix, bilateral tubes and ovaries Fresh Uterus  Ruben Chaudhary DO 3/13/2019 0934 Pathology    Uterine weight 195gm   Findings: Small sharply retroverted uterus, normal appearing ovaries and fallopian tubes with filshie clips in place bilaterally. Adhesions of the sigmoid colon and omentum to the left abdominal wall and pelvic sidewall, some of which were taken down. Possible endometriosis implants along the left IP. No other significant scar tissue or endometriosis present. Normal appearing bowel, gallbladder, appendix, and liver border.     Complications: None   Implants: * No implants in log *

## 2019-03-13 NOTE — ROUTINE PROCESS
Bedside and Verbal shift change report given to PRATIMA Redmond (oncoming nurse) by Jose Moore RN (offgoing nurse). Report included the following information SBAR, Kardex, Procedure Summary, Intake/Output, MAR and Recent Results.

## 2019-03-13 NOTE — H&P
Day of Surgery H&P Update     Pt seen and examined. Please see paper H&P from the office on 2/21/19. No interval changes. Diagnosis is AUB, pelvic pain, endometriosis. Planned procedure is total laparoscopic hysterectomy and bilateral salpingo-oophorectomy. Consents reviewed and signed, questions answered. Ancef 2gm IV for ABX PPx,. SCDs for DVT PPx. Proceed with surgery.      Preeti Taylor, DO

## 2019-03-13 NOTE — PERIOP NOTES
TRANSFER - OUT REPORT:    Verbal report given to EFREN Nam on Fran Aviles  being transferred to [facility] for routine post - op       Report consisted of patients Situation, Background, Assessment and   Recommendations(SBAR). Information from the following report(s) SBAR was reviewed with the receiving nurse. Lines:   Peripheral IV 03/13/19 Right Antecubital (Active)   Site Assessment Clean, dry, & intact 3/13/2019 11:03 AM   Phlebitis Assessment 0 3/13/2019 11:03 AM   Infiltration Assessment 0 3/13/2019 11:03 AM   Dressing Status Clean, dry, & intact 3/13/2019 11:03 AM   Dressing Type Transparent 3/13/2019 11:03 AM   Hub Color/Line Status Pink 3/13/2019 11:03 AM   Alcohol Cap Used Yes 3/13/2019  7:33 AM        Opportunity for questions and clarification was provided.       Patient transported with:   Registered Nurse

## 2019-03-13 NOTE — ANESTHESIA POSTPROCEDURE EVALUATION
Procedure(s):  TOTAL LAPAROSCOPIC HYSTERECTOMY, BILATERAL SALPINGOOPHERECTOMY, LYSIS OF ADHESIONS. Kana Baez     Anesthesia Post Evaluation      Multimodal analgesia: multimodal analgesia used between 6 hours prior to anesthesia start to PACU discharge  Patient location during evaluation: bedside  Patient participation: complete - patient participated  Level of consciousness: awake  Pain management: adequate  Airway patency: patent  Anesthetic complications: no  Cardiovascular status: acceptable  Respiratory status: acceptable and spontaneous ventilation  Hydration status: acceptable  Post anesthesia nausea and vomiting:  none      Visit Vitals  /69   Pulse 92   Temp 36.6 °C (97.8 °F)   Resp 22   Ht 5' 10\" (1.778 m)   Wt 117.5 kg (259 lb 0.7 oz)   SpO2 100%   BMI 37.17 kg/m²

## 2019-03-13 NOTE — PROGRESS NOTES
Levin removed and patient ambulated around room accompanied by RN. Output has been adequate but scant, encouraged patient to increase oral fluids, gave her a pitcher of water, and restarted a new bag of LR at 125/hr. Patient sitting up in rocking chair at bedside with call bell within reach and instructed to call for assistance getting back to bed or going to bathroom as she is hooked up to IV pole. RN will continue to monitor.

## 2019-03-13 NOTE — ROUTINE PROCESS
1315 call to Dr. Martha Lechuga regarding spot glucose check of 198, will hold insulin at present as patient is sleeping and only sips of water since admission to [facility]

## 2019-03-13 NOTE — PROGRESS NOTES
03/13/19 4:26 PM  CM spoke with patient to discuss discharge planning. Demographics were reviewed and confirmed. Patient lives with her  Valeria Fonseca ([phone]) in [place], [place]. Patient had rx coverage through her insurance and has an , Maye Do, who patient has contact information for. Patient fills her prescriptions at [facility]. Valeria Degree to drive home tomorrow at discharge. Has a few supports to help at home while  returns to work. Has food delivery service arranged through her insurance for the next 2 weeks. Denied any needs at this time. Obs letter explained and discussed, letter provided.   Care Management Interventions  PCP Verified by CM: Yes(OB or Dr. Joann Streeter)  Mode of Transport at Discharge: Self  Transition of Care Consult (CM Consult): Discharge Planning  Current Support Network: Lives with Spouse  Confirm Follow Up Transport: Family  Plan discussed with Pt/Family/Caregiver: Yes  Discharge Location  Discharge Placement: Home with outpatient services  AUDREY Richards

## 2019-03-13 NOTE — ANESTHESIA PREPROCEDURE EVALUATION
Anesthetic History   No history of anesthetic complications            Review of Systems / Medical History  Patient summary reviewed, nursing notes reviewed and pertinent labs reviewed    Pulmonary  Within defined limits                 Neuro/Psych             Comments: Peripheral neuropathy Cardiovascular    Hypertension: well controlled              Exercise tolerance: >4 METS  Comments: H/o \"tachycardia\"   GI/Hepatic/Renal           PUD     Endo/Other    Diabetes: well controlled, type 2         Other Findings              Physical Exam    Airway  Mallampati: III  TM Distance: 4 - 6 cm  Neck ROM: normal range of motion        Cardiovascular    Rhythm: regular  Rate: normal         Dental  No notable dental hx       Pulmonary  Breath sounds clear to auscultation               Abdominal         Other Findings            Anesthetic Plan    ASA: 3  Anesthesia type: general          Induction: Intravenous  Anesthetic plan and risks discussed with: Patient

## 2019-03-14 VITALS
HEIGHT: 70 IN | TEMPERATURE: 98.4 F | WEIGHT: 259.04 LBS | DIASTOLIC BLOOD PRESSURE: 83 MMHG | HEART RATE: 101 BPM | BODY MASS INDEX: 37.08 KG/M2 | SYSTOLIC BLOOD PRESSURE: 142 MMHG | RESPIRATION RATE: 15 BRPM | OXYGEN SATURATION: 98 %

## 2019-03-14 LAB
ERYTHROCYTE [DISTWIDTH] IN BLOOD BY AUTOMATED COUNT: 14.1 % (ref 11.5–14.5)
EST. AVERAGE GLUCOSE BLD GHB EST-MCNC: 217 MG/DL
GLUCOSE BLD STRIP.AUTO-MCNC: 192 MG/DL (ref 65–100)
HBA1C MFR BLD: 9.2 % (ref 4.2–6.3)
HCT VFR BLD AUTO: 36.5 % (ref 35–47)
HGB BLD-MCNC: 11.6 G/DL (ref 11.5–16)
MCH RBC QN AUTO: 26.9 PG (ref 26–34)
MCHC RBC AUTO-ENTMCNC: 31.8 G/DL (ref 30–36.5)
MCV RBC AUTO: 84.5 FL (ref 80–99)
NRBC # BLD: 0 K/UL (ref 0–0.01)
NRBC BLD-RTO: 0 PER 100 WBC
PLATELET # BLD AUTO: 225 K/UL (ref 150–400)
PMV BLD AUTO: 9.9 FL (ref 8.9–12.9)
RBC # BLD AUTO: 4.32 M/UL (ref 3.8–5.2)
SERVICE CMNT-IMP: ABNORMAL
WBC # BLD AUTO: 8.3 K/UL (ref 3.6–11)

## 2019-03-14 PROCEDURE — 74011250637 HC RX REV CODE- 250/637: Performed by: STUDENT IN AN ORGANIZED HEALTH CARE EDUCATION/TRAINING PROGRAM

## 2019-03-14 PROCEDURE — 74011250637 HC RX REV CODE- 250/637: Performed by: INTERNAL MEDICINE

## 2019-03-14 PROCEDURE — 36415 COLL VENOUS BLD VENIPUNCTURE: CPT

## 2019-03-14 PROCEDURE — 82962 GLUCOSE BLOOD TEST: CPT

## 2019-03-14 PROCEDURE — 83036 HEMOGLOBIN GLYCOSYLATED A1C: CPT

## 2019-03-14 PROCEDURE — 85027 COMPLETE CBC AUTOMATED: CPT

## 2019-03-14 PROCEDURE — 74011250636 HC RX REV CODE- 250/636: Performed by: STUDENT IN AN ORGANIZED HEALTH CARE EDUCATION/TRAINING PROGRAM

## 2019-03-14 PROCEDURE — 74011636637 HC RX REV CODE- 636/637: Performed by: STUDENT IN AN ORGANIZED HEALTH CARE EDUCATION/TRAINING PROGRAM

## 2019-03-14 PROCEDURE — 99218 HC RM OBSERVATION: CPT

## 2019-03-14 RX ORDER — IBUPROFEN 800 MG/1
800 TABLET ORAL
Qty: 40 TAB | Refills: 1 | Status: SHIPPED | OUTPATIENT
Start: 2019-03-14 | End: 2019-05-10 | Stop reason: ALTCHOICE

## 2019-03-14 RX ORDER — HYDROCODONE BITARTRATE AND ACETAMINOPHEN 7.5; 325 MG/1; MG/1
1 TABLET ORAL
Qty: 24 TAB | Refills: 0 | Status: SHIPPED | OUTPATIENT
Start: 2019-03-14 | End: 2019-03-17

## 2019-03-14 RX ORDER — IBUPROFEN 800 MG/1
800 TABLET ORAL
Status: DISCONTINUED | OUTPATIENT
Start: 2019-03-14 | End: 2019-03-14 | Stop reason: HOSPADM

## 2019-03-14 RX ADMIN — DOCUSATE SODIUM 100 MG: 100 CAPSULE, LIQUID FILLED ORAL at 09:25

## 2019-03-14 RX ADMIN — INSULIN LISPRO 2 UNITS: 100 INJECTION, SOLUTION INTRAVENOUS; SUBCUTANEOUS at 08:08

## 2019-03-14 RX ADMIN — HYDROCODONE BITARTRATE AND ACETAMINOPHEN 1 TABLET: 7.5; 325 TABLET ORAL at 06:17

## 2019-03-14 RX ADMIN — HYDROCODONE BITARTRATE AND ACETAMINOPHEN 1 TABLET: 7.5; 325 TABLET ORAL at 09:25

## 2019-03-14 RX ADMIN — DULOXETINE HYDROCHLORIDE 60 MG: 30 CAPSULE, DELAYED RELEASE ORAL at 09:25

## 2019-03-14 RX ADMIN — FAMOTIDINE 40 MG: 20 TABLET, FILM COATED ORAL at 09:25

## 2019-03-14 RX ADMIN — ESTROGENS, CONJUGATED 0.62 MG: 0.62 TABLET, FILM COATED ORAL at 09:26

## 2019-03-14 RX ADMIN — GLIPIZIDE 5 MG: 5 TABLET, FILM COATED, EXTENDED RELEASE ORAL at 09:24

## 2019-03-14 RX ADMIN — METOPROLOL TARTRATE 100 MG: 50 TABLET ORAL at 09:25

## 2019-03-14 RX ADMIN — IBUPROFEN 800 MG: 800 TABLET ORAL at 09:25

## 2019-03-14 RX ADMIN — KETOROLAC TROMETHAMINE 30 MG: 30 INJECTION, SOLUTION INTRAMUSCULAR; INTRAVENOUS at 02:05

## 2019-03-14 RX ADMIN — HYDROCODONE BITARTRATE AND ACETAMINOPHEN 1 TABLET: 7.5; 325 TABLET ORAL at 02:05

## 2019-03-14 NOTE — PROGRESS NOTES
Note that patient is likely to discharge later today. She can continue her home medications for diabetes. I will defer any additions to her PCP. Please feel free to call with any questions.

## 2019-03-14 NOTE — ROUTINE PROCESS
Bedside and Verbal shift change report given to Kimberly Mcfarlane RN (oncoming nurse) by Jenn Carballo RN (offgoing nurse). Report given with SBAR, Kardex, Intake/Output and MAR.

## 2019-03-14 NOTE — PROGRESS NOTES
1107: Discharge education completed. Patient has verbalized understanding and is in possession of all discharge education materials. Patient is discharged.

## 2019-03-14 NOTE — PROCEDURES
[facility]  PROCEDURE NOTE    Name:  Jaden Conde  MR#:  [id number]  :  1980  ACCOUNT #:  [de-identified]  DATE OF SERVICE:  2019      PREOPERATIVE DIAGNOSIS:  Pelvic pain with endometriosis. POSTOPERATIVE DIAGNOSIS:  Pelvic pain with endometriosis. PROCEDURE PERFORMED:  1. Total laparoscopic hysterectomy. 2.  Bilateral salpingo-oophorectomy. 3.  Lysis of adhesions. SURGEON:  Lazaro Mojica DO    ASSISTANT:  Robbie Brice MD    ANESTHESIA:  General.    ESTIMATED BLOOD LOSS:  200 mL. URINE OUTPUT:  125 mL. IV FLUID:  1600 mL of crystalloid. SPECIMENS REMOVED:  Uterus, bilateral fallopian tubes, and ovaries. FINDINGS:  Small sharply retroverted uterus, normal-appearing ovaries and fallopian tubes with filshie clips in place bilaterally. Adhesions of the sigmoid colon and omentum to the left abdominal wall and pelvic sidewall, some of which were taken down; possible endometriosis implants along the right IP. No other significant scar tissue or endometriosis present. Normal-appearing bowel, gallbladder, appendix, and liver border. COMPLICATIONS:  None. INDICATIONS:  The patient is a 23-year-old  2, para 2 with history of endometriosis diagnosed by diagnostic laparoscopy by her prior OB/GYN. She has been managed with different medical modalities in the past.  Most recently, has been on Depo-Provera x6 months, which has somewhat improved her pain; however, she continues to have very frequent spotting. After counseling regarding options, she certainly desired definitive surgical management with hysterectomy and bilateral salpingo-oophorectomy. Risks, benefits, and alternatives were discussed and after discussion, consents were signed and the patient was taken to the operating room for the following procedure.     DESCRIPTION OF PROCEDURE IN DETAIL:  The patient was taken to the operating room, positioned on the operating room table in supine position. After adequate anesthesia was achieved via general endotracheal anesthesia, she had her legs positioned in the [address] [place] [address]. Her arms were tucked to her side. Her abdomen, perineum, and vagina were prepped, and she was draped in a sterile fashion. After a timeout was performed, a sterile speculum was inserted into the vagina. The anterior lip of the cervix grasped with a single-tooth tenaculum, and a Hulka uterine manipulator was placed. A Levin catheter was also placed in her bladder. Next, the attention was turned to the abdominal portion of the procedure. A 5-mm incision was made in the umbilicus. Abdominal entry was obtained using the Veress needle. Opening pressure was noted to be 3 mmHg. The abdomen was insufflated to a pressure of 15 mmHg, and then, a 5-mm port was placed at the umbilicus using direct optical entry. Next, first the right lower quadrant port was placed under direct visualization and omental adhesions to the left abdominal and pelvic sidewall were dissected down using the harmonic device. Once these were freed, a left lower quadrant port was placed under direct visualization. All ports were 5 mm ports. The uterus was noted to be sharply retroverted; was ultimately manipulated using the VCare. The hysterectomy was began on the patient's left side. The infundibulopelvic ligament was cauterized and cut using the harmonic device. This was followed by dissection of the posterior leaf of the broad ligament. The round ligament was cauterized and cut using the harmonic, and then, the anterior leaf of the broad ligament was transected freeing up the bladder flap, mobilizing the bladder inferiorly off the lower uterine segment and vagina. The uterine arteries were skeletonized, cauterized, and cut using the harmonic device. Additional hemostasis was achieved using the bipolar [place] as needed.   Attention was then turned to the patient's right side, and similar procedure was carried out. After uterine arteries were cauterized and cut on the patient's right side. The uterosacral ligament was taken down using the harmonic. The bladder was continued to be mobilized anteriorly off the lower uterine segment, cervix, and vagina. The colpotomy was made following the VCare cup. This was followed around circumferentially freeing the specimen. The specimen was then removed vaginally. The vaginal cuff was closed using 0 Vicryl suture in a running fashion with good hemostasis. This was done from the vaginal approach. Again, the operators returned abdominally to view the pedicles which were noted to be hemostatic. Suction and irrigation was performed, and all pedicles remained hemostatic. The right lower quadrant ports were removed under direct visualization. The gas was allowed to escape from the abdomen. The umbilical port was then removed. The three incisions on the abdomen were then closed with 4-0 Monocryl and bandaged with Dermabond. The patient had her legs taken down out of the stirrups. She was awoken from anesthesia and taken to the recovery room in stable condition.       Kriss Valentin DO      KENDELL/[id number]  D:  03/13/2019 20:28  T:  03/14/2019 10:57  JOB #:  [id number]  CC:  María Reno DO

## 2019-03-14 NOTE — DISCHARGE INSTRUCTIONS
Laparoscopic Hysterectomy: What to Expect at Home     Your Recovery    No heavy lifting >20 pounds and nothing in the vagina/no intercourse for 6 weeks, until cleared by Dr. Rocky Wallace can expect to feel better and stronger each day, although you may need pain medicine for a week or two. You may get tired easily or have less energy than usual. This may last for several weeks after surgery. You will probably notice that your belly is swollen and puffy. This is common. The swelling will take several weeks to go down. You may take 4 to 6 weeks to fully recover. It is important to avoid lifting while you are recovering so that you can heal.    Follow-up care is a key part of your treatment and safety. Be sure to make and go to all appointments, and call my office if you are having problems. How can you care for yourself at home? Activity  Rest when you feel tired. Getting enough sleep will help you recover. Try to walk each day. Start by walking a little more than you did the day before. Bit by bit, increase the amount you walk. Walking boosts blood flow and helps prevent pneumonia and constipation. Avoid lifting anything that would make you strain. This may include grocery bags and milk containers, a heavy briefcase, dog food bags, a child, or a vacuum . Avoid strenuous activities, such as housework, aerobic exercise, or  weight lifting, until your doctor says it is okay. You may shower. If you have incisions in your belly, pat them dry when you are done. Do not take a bath for the first 2 weeks or until your doctor tells you it is okay. You may drive when you are no longer taking prescription pain medicine and can quickly move your foot from the gas pedal to the brake. You must also be able to sit comfortably for a long period of time, even if you do not plan on going far. You might get caught in traffic. You will probably need to take 2 to 4 weeks off from work.  It depends on the type of work you do and how you feel. Your doctor will tell you when you can have sex again. Diet  You can eat your normal diet. If your stomach is upset, try bland, low-fat foods like plain rice, broiled chicken, toast, and yogurt. Drink plenty of fluids (unless your doctor tells you not to). You may notice that your bowel movements are not regular right after your surgery. This is common. Try to avoid constipation and straining with bowel movements. You may want to take a fiber supplement or stool softener every day. If you have not had a bowel movement after a couple of days, ask your doctor about taking a mild laxative. Medicines  If the doctor gave you a prescription medicine for pain, take it as prescribed. If you are not taking a prescription pain medicine, take an over-the-counter medicine such as acetaminophen (Tylenol), ibuprofen (Advil, Motrin), or naproxen (Aleve). Read and follow all instructions on the label. Do not take two or more pain medicines at the same time unless the doctor told you to. Many pain medicines contain acetaminophen, which is Tylenol. Too much Tylenol can be harmful. If your doctor prescribed antibiotics, take them as directed. Do not stop taking them just because you feel better. You need to take the full course of antibiotics. If you think your pain medicine is making you sick to your stomach: Take your medicine after meals (unless your doctor tells you not to). Ask your doctor for a different pain medicine. Incision care  If you had surgery with a laparoscope, you may have skin glue or strips of tape over the cuts (incisions) the doctor made in your belly. Gently wash the area daily with warm, soapy water and pat it dry. Do not use other cleaning products, such as hydrogen peroxide which can make the wound heal more slowly. Do NOT bandage the incisions, but you may cover the areas with a gauze bandage if it weeps or rubs against clothing.  Change the bandage every day.  Keep the area clean and dry. Other instructions  You may have some light vaginal bleeding. Wear sanitary pads if needed. Do not douche or use tampons. When should you call for help? Call 911 anytime you think you may need emergency care. For example, call if:  You pass out (lose consciousness). You have sudden chest pain and shortness of breath, or you cough up blood. You have severe pain in your belly. Call your doctor now or seek immediate medical care if:  You have bright red vaginal bleeding that soaks one or more pads in an hour, or you have large clots. Your have foul-smelling discharge from your vagina. You are sick to your stomach or cannot keep fluids down. You have pain that does not get better after you take pain medicine. You have loose stitches, or your incision comes open. You have signs of infection, such as: Increased pain, swelling, warmth, or redness. Red streaks leading from your incision. Pus draining from your incision. Swollen lymph nodes in your neck, armpits, or groin. A fever. You have signs of a blood clot, such as:  Pain in your calf, back of knee, thigh, or groin. Redness and swelling in your leg or groin. You have trouble passing urine or stool, especially if you have pain or swelling in your lower belly. You have hot flashes, sweating, flushing, or a fast or pounding heartbeat. Watch closely for changes in your health, and be sure to contact your doctor if:  You do not have a bowel movement after taking a laxative.

## 2019-03-14 NOTE — PROGRESS NOTES
GYN POD 1    Analy Saeed      +OOB, pain well controlled, tolerating reg diet, +void    Vitals:  Visit Vitals  /88 (BP 1 Location: Left arm, BP Patient Position: At rest)   Pulse 72   Temp 98.4 °F (36.9 °C)   Resp 15   Ht 5' 10\" (1.778 m)   Wt 117.5 kg (259 lb 0.7 oz)   SpO2 98%   BMI 37.17 kg/m²     Temp (24hrs), Av.1 °F (36.7 °C), Min:97.8 °F (36.6 °C), Max:98.4 °F (36.9 °C)      Last 24hr Input/Output:    Intake/Output Summary (Last 24 hours) at 3/14/2019 0732  Last data filed at 3/14/2019 0400  Gross per 24 hour   Intake 3000 ml   Output 2910 ml   Net 90 ml          Exam:  Patient without distress. Abdomen soft, bowel sounds present, expected tenderness. Incisions dry and clean without erythema. Lower extremities are negative for swelling, cords, or tenderness.     Labs:   Lab Results   Component Value Date/Time    WBC 8.3 2019 02:21 AM    WBC 5.2 2019 11:32 AM    WBC 8.2 2010 03:43 PM    HGB 11.6 2019 02:21 AM    HGB 12.5 2019 11:32 AM    HGB 11.6 (L) 2010 03:43 PM    HGB 11.8 (L) 2009 09:45 AM    HCT 36.5 2019 02:21 AM    HCT 39.8 2019 11:32 AM    HCT 35.3 (L) 2010 03:43 PM    HCT 35.7 (L) 2009 09:45 AM    PLATELET 011  02:21 AM    PLATELET 745  11:32 AM    PLATELET 013  03:43 PM    PLATELET 908  09:45 AM       Recent Results (from the past 24 hour(s))   GLUCOSE, POC    Collection Time: 19 10:13 AM   Result Value Ref Range    Glucose (POC) 334 (H) 65 - 100 mg/dL    Performed by Janusz Moreno    GLUCOSE, POC    Collection Time: 19 11:24 AM   Result Value Ref Range    Glucose (POC) 243 (H) 65 - 100 mg/dL    Performed by Janusz PITTMAN, POC    Collection Time: 19  1:04 PM   Result Value Ref Range    Glucose (POC) 198 (H) 65 - 100 mg/dL    Performed by Michael Moses POC    Collection Time: 19  5:21 PM Result Value Ref Range    Glucose (POC) 147 (H) 65 - 100 mg/dL    Performed by Sally Emmanuel    GLUCOSE, POC    Collection Time: 03/13/19  9:05 PM   Result Value Ref Range    Glucose (POC) 186 (H) 65 - 100 mg/dL    Performed by Shanika Elliott    CBC W/O DIFF    Collection Time: 03/14/19  2:21 AM   Result Value Ref Range    WBC 8.3 3.6 - 11.0 K/uL    RBC 4.32 3.80 - 5.20 M/uL    HGB 11.6 11.5 - 16.0 g/dL    HCT 36.5 35.0 - 47.0 %    MCV 84.5 80.0 - 99.0 FL    MCH 26.9 26.0 - 34.0 PG    MCHC 31.8 30.0 - 36.5 g/dL    RDW 14.1 11.5 - 14.5 %    PLATELET 255 247 - 999 K/uL    MPV 9.9 8.9 - 12.9 FL    NRBC 0.0 0  WBC    ABSOLUTE NRBC 0.00 0.00 - 0.01 K/uL     Assessment: POD 1 s/p TLH BSO, stable    Plan:   1. Routine care  2. ADAT, OOB, ambulate  3.  Home later today, f/u in 2wks     [facility] [place]

## 2019-05-10 ENCOUNTER — OFFICE VISIT (OUTPATIENT)
Dept: ENDOCRINOLOGY | Age: 39
End: 2019-05-10

## 2019-05-10 VITALS
SYSTOLIC BLOOD PRESSURE: 104 MMHG | DIASTOLIC BLOOD PRESSURE: 68 MMHG | WEIGHT: 262.4 LBS | TEMPERATURE: 98.2 F | RESPIRATION RATE: 18 BRPM | HEIGHT: 70 IN | OXYGEN SATURATION: 98 % | BODY MASS INDEX: 37.56 KG/M2 | HEART RATE: 76 BPM

## 2019-05-10 DIAGNOSIS — E11.65 UNCONTROLLED TYPE 2 DIABETES MELLITUS WITH HYPERGLYCEMIA (HCC): Primary | ICD-10-CM

## 2019-05-10 DIAGNOSIS — E78.2 MIXED HYPERLIPIDEMIA: ICD-10-CM

## 2019-05-10 DIAGNOSIS — I10 ESSENTIAL HYPERTENSION: ICD-10-CM

## 2019-05-10 RX ORDER — METFORMIN HYDROCHLORIDE 500 MG/1
1000 TABLET, EXTENDED RELEASE ORAL 2 TIMES DAILY WITH MEALS
Qty: 120 TAB | Refills: 6 | Status: SHIPPED | OUTPATIENT
Start: 2019-05-10 | End: 2019-05-21

## 2019-05-10 RX ORDER — PEN NEEDLE, DIABETIC 31 GX3/16"
NEEDLE, DISPOSABLE MISCELLANEOUS
Qty: 100 PEN NEEDLE | Refills: 4 | Status: SHIPPED | OUTPATIENT
Start: 2019-05-10 | End: 2020-07-10

## 2019-05-10 RX ORDER — TIZANIDINE HYDROCHLORIDE 2 MG/1
2 CAPSULE, GELATIN COATED ORAL EVERY 6 HOURS
COMMUNITY
End: 2020-11-25 | Stop reason: ALTCHOICE

## 2019-05-10 RX ORDER — ESTRADIOL 1 MG/1
1 TABLET ORAL DAILY
COMMUNITY
End: 2020-11-25 | Stop reason: ALTCHOICE

## 2019-05-10 RX ORDER — GLIPIZIDE 5 MG/1
5 TABLET ORAL 2 TIMES DAILY
Qty: 60 TAB | Refills: 6 | Status: SHIPPED | OUTPATIENT
Start: 2019-05-10 | End: 2019-08-15 | Stop reason: ALTCHOICE

## 2019-05-10 NOTE — PROGRESS NOTES
HISTORY OF PRESENT ILLNESS Brittany Judd is a 34 y.o. female. HPI Patient here for initial visit of Type 2 diabetes mellitus . Referred : by self/pcp H/o diabetes for 3 years She wants to get care done as she saw her mother doing well with diabetes. Current A1C is over 9 %   From march 2019  and symptoms/problems include high sugars Current diabetic medications include glipizide  Er 5 mg once  A day . Current monitoring regimen: home blood tests - daily Home blood sugar records: trend: fluctuating a lot Any episodes of hypoglycemia? no 
 
Weight trend: increasing rapidly Prior visit with dietician: no 
Current diet: \"unhealthy\" diet in general 
Current exercise: no regular exercise Known diabetic complications: peripheral neuropathy Cardiovascular risk factors: dyslipidemia, diabetes mellitus, obesity, sedentary life style, hypertension, stress Eye exam current (within one year): no 
RENETTA: unknown Past Medical History:  
Diagnosis Date  AR (allergic rhinitis)  Autoimmune disease ([facility] [address]   
 fibromyalgia  Carpal tunnel syndrome  Diabetes ([facility] [address]  Eczema  Endometriosis  Essential hypertension  GERD (gastroesophageal reflux disease)  Heartburn  Hyperlipidemia  Ill-defined condition   
 peripheral neuropathy  Ill-defined condition   
 cyst on spine  PCOS (polycystic ovarian syndrome)  Psychiatric disorder   
 depression  PUD (peptic ulcer disease)  Scoliosis Past Surgical History:  
Procedure Laterality Date [address]  [address] Spinal fusion  HX OTHER SURGICAL    
 wisdom teeth extraction Current Outpatient Medications Medication Sig  estradiol (ESTRACE) 1 mg tablet Take 1 mg by mouth daily. Take 2 tabs once daily  tiZANidine (ZANAFLEX) 2 mg capsule Take 2 mg by mouth every six (6) hours.  glipiZIDE SR (GLUCOTROL XL) 5 mg CR tablet Take 5 mg by mouth daily.  multivitamin (ONE DAILY) tablet Take 1 Tab by mouth daily.  DULoxetine (CYMBALTA) 60 mg capsule Take 60 mg by mouth daily.  metoprolol tartrate (LOPRESSOR) 100 mg IR tablet Take  by mouth two (2) times a day.  omeprazole (PRILOSEC) 20 mg capsule Take 40 mg by mouth daily.  atorvastatin (LIPITOR) 20 mg tablet Take 20 mg by mouth nightly.  acetaminophen (TYLENOL) 500 mg tablet Take 500 mg by mouth every six (6) hours as needed for Pain.  fluticasone (FLONASE) 50 mcg/actuation nasal spray 2 Sprays by Both Nostrils route once. No current facility-administered medications for this visit. Review of Systems Constitutional: Negative. HENT: Negative. Eyes: Negative. Respiratory: Negative. Cardiovascular: Negative. Gastrointestinal: Negative. Genitourinary: Negative. Musculoskeletal: Negative. Skin: Negative. Neurological: Negative. Endo/Heme/Allergies: Negative. Psychiatric/Behavioral: Negative. Physical Exam  
Constitutional: She is oriented to person, place, and time. She appears well-developed and well-nourished. HENT:  
Head: Normocephalic. Eyes: Pupils are equal, round, and reactive to light. Conjunctivae and EOM are normal.  
Neck: Normal range of motion. Neck supple. Cardiovascular: Normal rate and regular rhythm. Pulmonary/Chest: Effort normal and breath sounds normal.  
Abdominal: Soft. Bowel sounds are normal.  
Musculoskeletal: Normal range of motion. Neurological: She is alert and oriented to person, place, and time. She has normal reflexes. Skin: Skin is warm and dry. Psychiatric: She has a normal mood and affect. Reviewed labs from pcp ASSESSMENT and PLAN 1. Type 2 DM poorly controlled :  a1c is over 9 %  From march 2019 Discussed patho-physiology of the disease Reviewed the glucose log : over 200 mg  
 Could not  tolerate  Plain metfomrin Will do metformin ER Stop er of glipizide and glipizide 5 mg bid Will do daily victoza Discussed pros and cons of victoza incl pancreatitis and MTC Patient is advised to check blood sugars 1-2 times daily by rotation method. reviewed medications and side effects in detail 
lab results and schedule of future lab studies reviewed with patient 
 
specific diabetic recommendations: diabetic diet discussed in detail, written exchange diet given, low cholesterol diet, weight control and daily exercise discussed, home glucose monitoring emphasized, glucose meter dispensed to patient, all medications, side effects and compliance discussed carefully, foot care discussed and Podiatry visits discussed, annual eye examinations at Ophthalmology discussed, glycohemoglobin and other lab monitoring discussed, long term diabetic complications discussed and labs immediately prior to next visit 2. Hypoglycemia :  Educated on treating the hypoglycemia. 3. HTN : continue current care. Patient is educated about importance of compliance with anti-hypertensives especially ARB/ACEI 4. Dyslipidemia : continue current meds. Patient is educated about benefits and adverse effects of statins and explained how benefits outweigh risk. 5. use of aspirin to prevent MI and TIA's discussed 6. Obesity : Body mass index is 37.65 kg/m². 7.  Neuropathy : diagnosed at age 19 She walks with help of cane > 50 % of time is spent on counseling Patient voiced understanding her plan of care

## 2019-05-10 NOTE — LETTER
5/12/19 Patient: Giuliana Ortiz YOB: 1980 Date of Visit: 5/10/2019 Neel Morales MD 
[address] [address] VIA Facsimile: [phone] Dear Neel Morales MD, Thank you for referring Ms. Victorina Horan to [id number] [place] [address] for evaluation. My notes for this consultation are attached. If you have questions, please do not hesitate to call me. I look forward to following your patient along with you. Sincerely, Ross Bradshaw MD

## 2019-05-10 NOTE — PROGRESS NOTES
1. Have you been to the ER, urgent care clinic since your last visit? No  Hospitalized since your last visit? No 
 
2. Have you seen or consulted any other health care providers outside of the [address] [place] [address] since your last visit? Include any pap smears or colon screening. No 
 
Wt Readings from Last 3 Encounters:  
05/10/19 262 lb 6.4 oz (119 kg) 03/13/19 259 lb 0.7 oz (117.5 kg) 02/21/19 260 lb 6 oz (118.1 kg) Temp Readings from Last 3 Encounters:  
05/10/19 98.2 °F (36.8 °C) (Oral) 03/14/19 98.4 °F (36.9 °C)  
02/21/19 97.4 °F (36.3 °C) BP Readings from Last 3 Encounters:  
05/10/19 104/68  
03/14/19 142/83  
02/21/19 142/81 Pulse Readings from Last 3 Encounters:  
05/10/19 76  
03/14/19 (!) 101  
02/21/19 93 Lab Results Component Value Date/Time  Hemoglobin A1c 9.2 (H) 03/14/2019 02:21 AM

## 2019-05-10 NOTE — PATIENT INSTRUCTIONS
Refills -Please call your pharmacy and have them send us a refill request. 
Results - Allow up to a week for lab results to be processed and reviewed. Phone calls - Allow up to 24 hours for non-urgent calls to be returned. Prior Authorization - May take up to 4 weeks to process depending on your insurance. Forms - FMLA, DMV, Patient Assistance, etc. will take up to 2 weeks to process. Cancellations - Please notify the office in advance if you cannot keep your appointment. Samples - Will only be dispensed at visits as supplies are limited. If you are having a medical emergency, call 911. 
 
 
-------------------------------------------------------------------------------------------------------------- Start on metformin  mg 2 pills twice a day  With meals  ( START ON ONE PILL AT A TIME AND INCREASE IT ) START ON VICTOZA 0.6 MG A DAY AND INCREASE TO 1.2 MG AFTER A WEEK  
 
 
 
STOP GLIPIZIDE ER  
START ON GLIPIZIDE  5 MG BEFORE B-FAST AND DINNER  
 
 
------------------------------------------------------------------------------------------------------------------------------Do not skip meals Do not eat in between meals Reduce carbs- pasta, rice, potatoes, bread Do not drink juices or sodas, EVEN  SUGAR FREE Donot eat peanut butter Do not eat sugar free cookies and cakes Do not eat peaCHES, GRAPES, ORANGES, FRUIT MEDLEYS , RAISINS

## 2019-05-13 RX ORDER — LANCETS 33 GAUGE
EACH MISCELLANEOUS
Qty: 100 LANCET | Refills: 4 | Status: SHIPPED | OUTPATIENT
Start: 2019-05-13 | End: 2020-09-21 | Stop reason: SDUPTHER

## 2019-05-13 RX ORDER — BLOOD-GLUCOSE METER
EACH MISCELLANEOUS
Qty: 1 EACH | Refills: 0 | Status: SHIPPED | OUTPATIENT
Start: 2019-05-13 | End: 2020-09-17 | Stop reason: SDUPTHER

## 2019-05-21 DIAGNOSIS — E11.65 UNCONTROLLED TYPE 2 DIABETES MELLITUS WITH HYPERGLYCEMIA (HCC): Primary | ICD-10-CM

## 2019-05-21 RX ORDER — METFORMIN HYDROCHLORIDE 500 MG/1
TABLET, EXTENDED RELEASE ORAL
Qty: 120 TAB | Refills: 6
Start: 2019-05-21 | End: 2019-08-15 | Stop reason: ALTCHOICE

## 2019-06-09 ENCOUNTER — DOCUMENTATION ONLY (OUTPATIENT)
Dept: ENDOCRINOLOGY | Age: 39
End: 2019-06-09

## 2019-06-09 NOTE — PROGRESS NOTES
Reviewed information about neurology procedure patient had from Dr. Jaydon Mast notes dated May 16, 2018  Patient had chemodenervation of innervated facial trigeminal cervical spine   Patient had Botox injections for chronic intractable migraine

## 2019-08-09 DIAGNOSIS — E78.2 MIXED HYPERLIPIDEMIA: ICD-10-CM

## 2019-08-09 DIAGNOSIS — I10 ESSENTIAL HYPERTENSION: ICD-10-CM

## 2019-08-09 DIAGNOSIS — E11.65 UNCONTROLLED TYPE 2 DIABETES MELLITUS WITH HYPERGLYCEMIA (HCC): ICD-10-CM

## 2019-08-11 LAB
ALBUMIN SERPL-MCNC: 3.9 G/DL (ref 3.5–5.5)
ALBUMIN/CREAT UR: 2.6 MG/G CREAT (ref 0–30)
ALBUMIN/GLOB SERPL: 1.3 {RATIO} (ref 1.2–2.2)
ALP SERPL-CCNC: 83 IU/L (ref 39–117)
ALT SERPL-CCNC: 16 IU/L (ref 0–32)
AST SERPL-CCNC: 14 IU/L (ref 0–40)
BILIRUB SERPL-MCNC: 0.3 MG/DL (ref 0–1.2)
BUN SERPL-MCNC: 6 MG/DL (ref 6–24)
BUN/CREAT SERPL: 6 (ref 9–23)
CALCIUM SERPL-MCNC: 9.3 MG/DL (ref 8.7–10.2)
CHLORIDE SERPL-SCNC: 102 MMOL/L (ref 96–106)
CHOLEST SERPL-MCNC: 167 MG/DL (ref 100–199)
CO2 SERPL-SCNC: 25 MMOL/L (ref 20–29)
CREAT SERPL-MCNC: 1.01 MG/DL (ref 0.57–1)
CREAT UR-MCNC: 217.4 MG/DL
EST. AVERAGE GLUCOSE BLD GHB EST-MCNC: 137 MG/DL
GLOBULIN SER CALC-MCNC: 3.1 G/DL (ref 1.5–4.5)
GLUCOSE SERPL-MCNC: 112 MG/DL (ref 65–99)
HBA1C MFR BLD: 6.4 % (ref 4.8–5.6)
HDLC SERPL-MCNC: 49 MG/DL
INTERPRETATION, 910389: NORMAL
LDLC SERPL CALC-MCNC: 95 MG/DL (ref 0–99)
Lab: NORMAL
MICROALBUMIN UR-MCNC: 5.7 UG/ML
POTASSIUM SERPL-SCNC: 4 MMOL/L (ref 3.5–5.2)
PROT SERPL-MCNC: 7 G/DL (ref 6–8.5)
SODIUM SERPL-SCNC: 141 MMOL/L (ref 134–144)
TRIGL SERPL-MCNC: 117 MG/DL (ref 0–149)
VLDLC SERPL CALC-MCNC: 23 MG/DL (ref 5–40)

## 2019-08-15 ENCOUNTER — OFFICE VISIT (OUTPATIENT)
Dept: ENDOCRINOLOGY | Age: 39
End: 2019-08-15

## 2019-08-15 VITALS
WEIGHT: 262.6 LBS | HEART RATE: 82 BPM | HEIGHT: 70 IN | DIASTOLIC BLOOD PRESSURE: 72 MMHG | TEMPERATURE: 98 F | RESPIRATION RATE: 20 BRPM | SYSTOLIC BLOOD PRESSURE: 116 MMHG | BODY MASS INDEX: 37.59 KG/M2 | OXYGEN SATURATION: 100 %

## 2019-08-15 DIAGNOSIS — E11.65 UNCONTROLLED TYPE 2 DIABETES MELLITUS WITH HYPERGLYCEMIA (HCC): Primary | ICD-10-CM

## 2019-08-15 DIAGNOSIS — I10 ESSENTIAL HYPERTENSION: ICD-10-CM

## 2019-08-15 DIAGNOSIS — E78.2 MIXED HYPERLIPIDEMIA: ICD-10-CM

## 2019-08-15 RX ORDER — METRONIDAZOLE 500 MG/1
TABLET ORAL
Refills: 0 | COMMUNITY
Start: 2019-08-05 | End: 2019-08-15 | Stop reason: ALTCHOICE

## 2019-08-15 NOTE — PROGRESS NOTES
1. Have you been to the ER, urgent care clinic since your last visit? No  Hospitalized since your last visit? No    2. Have you seen or consulted any other health care providers outside of the [address] [place] [address] since your last visit? Include any pap smears or colon screening. No     Wt Readings from Last 3 Encounters:   08/15/19 262 lb 9.6 oz (119.1 kg)   05/10/19 262 lb 6.4 oz (119 kg)   03/13/19 259 lb 0.7 oz (117.5 kg)     Temp Readings from Last 3 Encounters:   08/15/19 98 °F (36.7 °C) (Oral)   05/10/19 98.2 °F (36.8 °C) (Oral)   03/14/19 98.4 °F (36.9 °C)     BP Readings from Last 3 Encounters:   08/15/19 116/72   05/10/19 104/68   03/14/19 142/83     Pulse Readings from Last 3 Encounters:   08/15/19 82   05/10/19 76   03/14/19 (!) 101     Lab Results   Component Value Date/Time    Hemoglobin A1c 6.4 (H) 08/09/2019 11:20 AM     Patient has meter today.

## 2019-08-15 NOTE — LETTER
8/18/19 Patient: Angel Bedolla YOB: 1980 Date of Visit: 8/15/2019 Love Mckay NP 
[address] [place], [place] [id number] VIA Facsimile: [phone] Dear Love Mckay NP, Thank you for referring Ms. Yann Guajardo to [address] [place] [address] for evaluation. My notes for this consultation are attached. If you have questions, please do not hesitate to call me. I look forward to following your patient along with you. Sincerely, London Danielle MD

## 2019-08-15 NOTE — PROGRESS NOTES
HISTORY OF PRESENT ILLNESS  Dax Villarreal is a 37 y.o. female. HPI   Patient here for FIRST FOLLOW UP AFTER   initial visit of Type 2 diabetes mellitus  From   May 2019     WEIGHT IS STABLE   SHE HAS DONE VERY WELL     SUGARS ARE GREAT ON LOG     SHE HAS DONE BY PLANNING MEALS  FOR NEXT DAY   SHE HAS STOPPED FAST FOODS       She stopped metformin  In may 2019 - via email conversation   She stopped glipizide in July for low sugars             OLD HISTORY  :   Referred : by self/pcp    H/o diabetes for 3 years     She wants to get care done as she saw her mother doing well with diabetes. Current A1C is over 9 %   From march 2019  and symptoms/problems include high sugars      Current diabetic medications include glipizide  Er 5 mg once  A day .      Current monitoring regimen: home blood tests - daily  Home blood sugar records: trend: fluctuating a lot  Any episodes of hypoglycemia? no    Weight trend: increasing rapidly  Prior visit with dietician: no  Current diet: \"unhealthy\" diet in general  Current exercise: no regular exercise    Known diabetic complications: peripheral neuropathy  Cardiovascular risk factors: dyslipidemia, diabetes mellitus, obesity, sedentary life style, hypertension, stress    Eye exam current (within one year): no  RENETTA: unknown     Past Medical History:   Diagnosis Date    AR (allergic rhinitis)     Autoimmune disease ([address]     fibromyalgia    Carpal tunnel syndrome     Diabetes ([address]     Eczema     Endometriosis     Essential hypertension     GERD (gastroesophageal reflux disease)     Heartburn     Hyperlipidemia     Ill-defined condition     peripheral neuropathy    Ill-defined condition     cyst on spine    PCOS (polycystic ovarian syndrome)     Psychiatric disorder     depression    PUD (peptic ulcer disease)     Scoliosis      Past Surgical History:   Procedure Laterality Date    BIOPSY OF KIDNEY,OPEN EXPOS  2004    HX ORTHOPAEDIC  1994    Spinal fusion    HX OTHER SURGICAL      wisdom teeth extraction     Current Outpatient Medications   Medication Sig    metroNIDAZOLE (FLAGYL) 500 mg tablet TK 1 T PO BID FOR 10 DAYS    SITagliptin (JANUVIA) 100 mg tablet Take 1 Tab by mouth daily.  metFORMIN ER (GLUCOPHAGE XR) 500 mg tablet Decreased the dose as pt called in for GI distress    glucose blood VI test strips (ONETOUCH VERIO) strip Use to test blood sugars once daily. Dx. Code E11.65    lancets (ONE TOUCH DELICA) 33 gauge misc Use to check blood sugars once daily. Dx. Code E11.65    Blood-Glucose Meter (ONETOUCH VERIO FLEX) misc Use to check blood sugars once daily. Dx. Code E11.65    estradiol (ESTRACE) 1 mg tablet Take 1 mg by mouth daily. Take 2 tabs once daily    tiZANidine (ZANAFLEX) 2 mg capsule Take 2 mg by mouth every six (6) hours.  liraglutide (VICTOZA) 0.6 mg/0.1 mL (18 mg/3 mL) pnij 1.2 mg by SubCUTAneous route daily.  Insulin Needles, Disposable, (ACACIA PEN NEEDLE) 32 gauge x 5/32\" ndle Use once daily. Dx code E11.65    multivitamin (ONE DAILY) tablet Take 1 Tab by mouth daily.  DULoxetine (CYMBALTA) 60 mg capsule Take 60 mg by mouth daily.  metoprolol tartrate (LOPRESSOR) 100 mg IR tablet Take  by mouth two (2) times a day.  omeprazole (PRILOSEC) 20 mg capsule Take 40 mg by mouth daily.  atorvastatin (LIPITOR) 20 mg tablet Take 20 mg by mouth nightly.  acetaminophen (TYLENOL) 500 mg tablet Take 500 mg by mouth every six (6) hours as needed for Pain.  fluticasone (FLONASE) 50 mcg/actuation nasal spray 2 Sprays by Both Nostrils route once.  liraglutide (VICTOZA) 0.6 mg/0.1 mL (18 mg/3 mL) pnij sample    glipiZIDE (GLUCOTROL) 5 mg tablet Take 1 Tab by mouth two (2) times a day. Twenty minutes before breakfast and dinner     No current facility-administered medications for this visit. Review of Systems   Constitutional: Negative. HENT: Negative. Eyes: Negative. Respiratory: Negative.     Cardiovascular: Negative. Gastrointestinal: Negative. Genitourinary: Negative. Musculoskeletal: Negative. Skin: Negative. Neurological: Negative. Endo/Heme/Allergies: Negative. Psychiatric/Behavioral: Negative. Physical Exam   Constitutional: She is oriented to person, place, and time. She appears well-developed and well-nourished. HENT:   Head: Normocephalic. Eyes: Pupils are equal, round, and reactive to light. Conjunctivae and EOM are normal.   Neck: Normal range of motion. Neck supple. Cardiovascular: Normal rate and regular rhythm. Pulmonary/Chest: Effort normal and breath sounds normal.   Abdominal: Soft. Bowel sounds are normal.   Musculoskeletal: Normal range of motion. Neurological: She is alert and oriented to person, place, and time. She has normal reflexes. Skin: Skin is warm and dry. Psychiatric: She has a normal mood and affect. Diabetic foot exam: august 2019     Left Foot:   Visual Exam: normal    Pulse DP: 2+ (normal)   Filament test: normal sensation    Vibratory sensation: normal      Right Foot:   Visual Exam: normal    Pulse DP: 2+ (normal)   Filament test: normal sensation    Vibratory sensation: normal            Lab Results   Component Value Date/Time    Hemoglobin A1c 6.4 (H) 08/09/2019 11:20 AM    Hemoglobin A1c 9.2 (H) 03/14/2019 02:21 AM    Glucose 112 (H) 08/09/2019 11:20 AM    Glucose (POC) 192 (H) 03/14/2019 08:03 AM    Microalb/Creat ratio (ug/mg creat.) 2.6 08/09/2019 11:20 AM    LDL, calculated 95 08/09/2019 11:20 AM    Creatinine 1.01 (H) 08/09/2019 11:20 AM      Lab Results   Component Value Date/Time    Cholesterol, total 167 08/09/2019 11:20 AM    HDL Cholesterol 49 08/09/2019 11:20 AM    LDL, calculated 95 08/09/2019 11:20 AM    LDL-C, External 96 02/11/2019    Triglyceride 117 08/09/2019 11:20 AM     Lab Results   Component Value Date/Time    ALT (SGPT) 16 08/09/2019 11:20 AM    AST (SGOT) 14 08/09/2019 11:20 AM    Alk.  phosphatase 83 08/09/2019 11:20 AM    Bilirubin, total 0.3 08/09/2019 11:20 AM    Albumin 3.9 08/09/2019 11:20 AM    Protein, total 7.0 08/09/2019 11:20 AM    PLATELET 345 54/80/9890 02:21 AM     Lab Results   Component Value Date/Time    GFR est non-AA 69 08/09/2019 11:20 AM    GFR est AA 80 08/09/2019 11:20 AM    Creatinine 1.01 (H) 08/09/2019 11:20 AM    BUN 6 08/09/2019 11:20 AM    Sodium 141 08/09/2019 11:20 AM    Potassium 4.0 08/09/2019 11:20 AM    Chloride 102 08/09/2019 11:20 AM    CO2 25 08/09/2019 11:20 AM         ASSESSMENT and PLAN    1. Type 2 DM UN controlled :  a1c is    6.4 %     FROM AUGUST 2019     COMPARED TO  over 9 %  From march 2019     SHE DID SO WELL by a1c but she is reporting very low sugars on glipizide until July 2019     Reviewed the glucose log :  mg   Could not  tolerate  Plain metfomrin   She stopped metformin  In may 2019 - via email conversation   She stopped glipizide in July for low sugars     STAY ON  victoza   AND Junesherita Cuellar   ( will wait fo rchange of care in 3 months after next a1c)  Discussed pros and cons of victoza incl pancreatitis and [address]    Patient is advised to check blood sugars 1-2 times daily by rotation method. reviewed medications and side effects in detail  lab results and schedule of future lab studies reviewed with patient      2. Hypoglycemia :  Educated on treating the hypoglycemia. 3. HTN : well controlled, not on any meds. Patient is educated about importance of compliance with anti-hypertensives especially ARB/ACEI    4. Dyslipidemia : continue lipitor . Patient is educated about benefits and adverse effects of statins and explained how benefits outweigh risk. 5. use of aspirin to prevent MI and TIA's discussed      6. Obesity : Body mass index is 37.68 kg/m².       7.  Neuropathy : diagnosed at age 19   She walks with help of cane     Reviewed information about neurology procedure patient had from Dr. Cornelius Rucker notes dated May 16, 2018  Patient had chemodenervation of innervated facial trigeminal cervical spine   Patient had Botox injections for chronic intractable migraine        8.  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

## 2019-08-15 NOTE — PATIENT INSTRUCTIONS
Refills -Please call your pharmacy and have them send us a refill request. 
Results - Allow up to a week for lab results to be processed and reviewed. Phone calls - Allow up to 24 hours for non-urgent calls to be returned. Prior Authorization - May take up to 4 weeks to process depending on your insurance. Forms - FMLA, DMV, Patient Assistance, etc. will take up to 2 weeks to process. Cancellations - Please notify the office in advance if you cannot keep your appointment. Samples - Will only be dispensed at visits as supplies are limited. If you are having a medical emergency, call 911. 
 
 
-------------------------------------------------------------------------------------------------------------- StOPPED  metformin ER  For GI distress On  januvia 100 mg a day VICTOZA  1.2 MG AFTER A WEEK  
 
 
 
----------------------------------------------------------------------------------------------------------------Do not skip meals Do not eat in between meals Reduce carbs- pasta, rice, potatoes, bread Do not drink juices or sodas, EVEN  SUGAR FREE Donot eat peanut butter Do not eat sugar free cookies and cakes Do not eat peaCHES, GRAPES, ORANGES, FRUIT MEDLEYS , RAISINS

## 2019-11-02 ENCOUNTER — ED HISTORICAL/CONVERTED ENCOUNTER (OUTPATIENT)
Dept: OTHER | Age: 39
End: 2019-11-02

## 2019-11-21 ENCOUNTER — LAB ONLY (OUTPATIENT)
Dept: ENDOCRINOLOGY | Age: 39
End: 2019-11-21

## 2019-11-21 ENCOUNTER — HOSPITAL ENCOUNTER (OUTPATIENT)
Dept: LAB | Age: 39
Discharge: HOME | End: 2019-11-21

## 2019-11-21 DIAGNOSIS — E11.65 UNCONTROLLED TYPE 2 DIABETES MELLITUS WITH HYPERGLYCEMIA (HCC): ICD-10-CM

## 2019-11-21 DIAGNOSIS — E78.2 MIXED HYPERLIPIDEMIA: ICD-10-CM

## 2019-11-21 DIAGNOSIS — E11.65 UNCONTROLLED TYPE 2 DIABETES MELLITUS WITH HYPERGLYCEMIA (HCC): Primary | ICD-10-CM

## 2019-11-21 DIAGNOSIS — I10 ESSENTIAL HYPERTENSION: ICD-10-CM

## 2019-11-21 LAB
ALBUMIN SERPL-MCNC: 3.1 G/DL (ref 3.5–5)
ALBUMIN/GLOB SERPL: 0.8 {RATIO} (ref 1.1–2.2)
ALP SERPL-CCNC: 106 U/L (ref 45–117)
ALT SERPL-CCNC: 29 U/L (ref 12–78)
ANION GAP SERPL CALC-SCNC: 3 MMOL/L (ref 5–15)
AST SERPL-CCNC: 17 U/L (ref 15–37)
BILIRUB SERPL-MCNC: 0.3 MG/DL (ref 0.2–1)
BUN SERPL-MCNC: 5 MG/DL (ref 6–20)
BUN/CREAT SERPL: 5 (ref 12–20)
CALCIUM SERPL-MCNC: 8.5 MG/DL (ref 8.5–10.1)
CHLORIDE SERPL-SCNC: 106 MMOL/L (ref 97–108)
CHOLEST SERPL-MCNC: 170 MG/DL
CO2 SERPL-SCNC: 32 MMOL/L (ref 21–32)
CREAT SERPL-MCNC: 0.92 MG/DL (ref 0.55–1.02)
CREAT UR-MCNC: 96.6 MG/DL
EST. AVERAGE GLUCOSE BLD GHB EST-MCNC: 137 MG/DL
GLOBULIN SER CALC-MCNC: 4 G/DL (ref 2–4)
GLUCOSE SERPL-MCNC: 144 MG/DL (ref 65–100)
HBA1C MFR BLD: 6.4 % (ref 4–5.6)
HDLC SERPL-MCNC: 55 MG/DL
HDLC SERPL: 3.1 {RATIO} (ref 0–5)
LDLC SERPL CALC-MCNC: 87.6 MG/DL (ref 0–100)
LIPID PROFILE,FLP: NORMAL
MICROALBUMIN UR-MCNC: <0.5 MG/DL
MICROALBUMIN/CREAT UR-RTO: NORMAL MG/G (ref 0–30)
POTASSIUM SERPL-SCNC: 4.3 MMOL/L (ref 3.5–5.1)
PROT SERPL-MCNC: 7.1 G/DL (ref 6.4–8.2)
SODIUM SERPL-SCNC: 141 MMOL/L (ref 136–145)
TRIGL SERPL-MCNC: 137 MG/DL (ref ?–150)
VLDLC SERPL CALC-MCNC: 27.4 MG/DL

## 2019-11-27 ENCOUNTER — OFFICE VISIT (OUTPATIENT)
Dept: ENDOCRINOLOGY | Age: 39
End: 2019-11-27

## 2019-11-27 VITALS
DIASTOLIC BLOOD PRESSURE: 74 MMHG | HEIGHT: 70 IN | HEART RATE: 94 BPM | OXYGEN SATURATION: 99 % | WEIGHT: 263.2 LBS | BODY MASS INDEX: 37.68 KG/M2 | TEMPERATURE: 97.6 F | SYSTOLIC BLOOD PRESSURE: 122 MMHG | RESPIRATION RATE: 20 BRPM

## 2019-11-27 DIAGNOSIS — E11.65 UNCONTROLLED TYPE 2 DIABETES MELLITUS WITH HYPERGLYCEMIA (HCC): ICD-10-CM

## 2019-11-27 DIAGNOSIS — E11.65 UNCONTROLLED TYPE 2 DIABETES MELLITUS WITH HYPERGLYCEMIA (HCC): Primary | ICD-10-CM

## 2019-11-27 DIAGNOSIS — I10 ESSENTIAL HYPERTENSION: ICD-10-CM

## 2019-11-27 DIAGNOSIS — E78.2 MIXED HYPERLIPIDEMIA: ICD-10-CM

## 2019-11-27 NOTE — PROGRESS NOTES
HISTORY OF PRESENT ILLNESS  Colonel Ramires is a 37 y.o. female. HPI   Patient here for FOLLOW UP AFTER   Last  visit of Type 2 diabetes mellitus  From August 2019       Pt reports having s/s of stroke/TIA in nov 2019   She reports having headache, dropping face on right side , and was found to have elevated  /120 and blood sugar was 107 mg   She went to ER     Was told it was migraine   She has an appt with neurologist  Soon           Old history :     WEIGHT IS STABLE   SHE HAS DONE VERY WELL     SUGARS ARE GREAT ON LOG     SHE HAS DONE BY PLANNING MEALS  FOR NEXT DAY   SHE HAS STOPPED FAST FOODS     She stopped metformin  In may 2019 - via email conversation   She stopped glipizide in July for low sugars             OLD HISTORY  :   Referred : by self/pcp    H/o diabetes for 3 years     She wants to get care done as she saw her mother doing well with diabetes. Current A1C is over 9 %   From march 2019  and symptoms/problems include high sugars      Current diabetic medications include glipizide  Er 5 mg once  A day .      Current monitoring regimen: home blood tests - daily  Home blood sugar records: trend: fluctuating a lot  Any episodes of hypoglycemia? no    Weight trend: increasing rapidly  Prior visit with dietician: no  Current diet: \"unhealthy\" diet in general  Current exercise: no regular exercise    Known diabetic complications: peripheral neuropathy  Cardiovascular risk factors: dyslipidemia, diabetes mellitus, obesity, sedentary life style, hypertension, stress    Eye exam current (within one year): no  RENETTA: unknown     Past Medical History:   Diagnosis Date    AR (allergic rhinitis)     Autoimmune disease ([address]     fibromyalgia    Carpal tunnel syndrome     Diabetes [facility]     Eczema     Endometriosis     Essential hypertension     GERD (gastroesophageal reflux disease)     Heartburn     Hyperlipidemia     Ill-defined condition     peripheral neuropathy    Ill-defined condition     cyst on spine    PCOS (polycystic ovarian syndrome)     Psychiatric disorder     depression    PUD (peptic ulcer disease)     Scoliosis      Past Surgical History:   Procedure Laterality Date    BIOPSY OF KIDNEY,OPEN EXPOS  2004    HX ORTHOPAEDIC  1994    Spinal fusion    HX OTHER SURGICAL      wisdom teeth extraction     Current Outpatient Medications   Medication Sig    SITagliptin (JANUVIA) 100 mg tablet Take 1 Tab by mouth daily.  glucose blood VI test strips (ONETOUCH VERIO) strip Use to test blood sugars once daily. Dx. Code E11.65    lancets (ONE TOUCH DELICA) 33 gauge misc Use to check blood sugars once daily. Dx. Code E11.65    Blood-Glucose Meter (ONETOUCH VERIO FLEX) misc Use to check blood sugars once daily. Dx. Code E11.65    estradiol (ESTRACE) 1 mg tablet Take 1 mg by mouth daily. Take 2 tabs once daily    tiZANidine (ZANAFLEX) 2 mg capsule Take 2 mg by mouth every six (6) hours.  Insulin Needles, Disposable, (ACACIA PEN NEEDLE) 32 gauge x 5/32\" ndle Use once daily. Dx code E11.65    multivitamin (ONE DAILY) tablet Take 1 Tab by mouth daily.  DULoxetine (CYMBALTA) 60 mg capsule Take 60 mg by mouth daily.  metoprolol tartrate (LOPRESSOR) 100 mg IR tablet Take  by mouth two (2) times a day.  omeprazole (PRILOSEC) 20 mg capsule Take 40 mg by mouth daily.  atorvastatin (LIPITOR) 20 mg tablet Take 20 mg by mouth nightly.  acetaminophen (TYLENOL) 500 mg tablet Take 500 mg by mouth every six (6) hours as needed for Pain.  fluticasone (FLONASE) 50 mcg/actuation nasal spray 2 Sprays by Both Nostrils route once.  liraglutide (VICTOZA) 0.6 mg/0.1 mL (18 mg/3 mL) pnij sample    liraglutide (VICTOZA) 0.6 mg/0.1 mL (18 mg/3 mL) pnij 1.2 mg by SubCUTAneous route daily. No current facility-administered medications for this visit. Review of Systems   Constitutional: Negative. HENT: Negative. Eyes: Negative. Respiratory: Negative. Cardiovascular: Negative. Gastrointestinal: Negative. Genitourinary: Negative. Musculoskeletal: Negative. Skin: Negative. Neurological: Negative. Endo/Heme/Allergies: Negative. Psychiatric/Behavioral: Negative. Physical Exam   Constitutional: She is oriented to person, place, and time. She appears well-developed and well-nourished. HENT:   Head: Normocephalic. Eyes: Pupils are equal, round, and reactive to light. Conjunctivae and EOM are normal.   Neck: Normal range of motion. Neck supple. Cardiovascular: Normal rate and regular rhythm. Pulmonary/Chest: Effort normal and breath sounds normal.   Abdominal: Soft. Bowel sounds are normal.   Musculoskeletal: Normal range of motion. Neurological: She is alert and oriented to person, place, and time. She has normal reflexes. Skin: Skin is warm and dry. Psychiatric: She has a normal mood and affect. Diabetic foot exam: august 2019     Left Foot:   Visual Exam: normal    Pulse DP: 2+ (normal)   Filament test: normal sensation    Vibratory sensation: normal      Right Foot:   Visual Exam: normal    Pulse DP: 2+ (normal)   Filament test: normal sensation    Vibratory sensation: normal            Lab Results   Component Value Date/Time    Hemoglobin A1c 6.4 (H) 11/21/2019 10:22 AM    Hemoglobin A1c 6.4 (H) 08/09/2019 11:20 AM    Hemoglobin A1c 9.2 (H) 03/14/2019 02:21 AM    Glucose 144 (H) 11/21/2019 10:22 AM    Glucose (POC) 192 (H) 03/14/2019 08:03 AM    Microalbumin/Creat ratio (mg/g creat)  11/21/2019 10:22 AM     Cannot calculate ratio due to microalbumin result outside reportable range.     Microalbumin,urine random <0.50 11/21/2019 10:22 AM    LDL, calculated 87.6 11/21/2019 10:22 AM    Creatinine 0.92 11/21/2019 10:22 AM      Lab Results   Component Value Date/Time    Cholesterol, total 170 11/21/2019 10:22 AM    HDL Cholesterol 55 11/21/2019 10:22 AM    LDL, calculated 87.6 11/21/2019 10:22 AM    LDL-C, External 96 02/11/2019    Triglyceride 137 11/21/2019 10:22 AM    CHOL/HDL Ratio 3.1 11/21/2019 10:22 AM     Lab Results   Component Value Date/Time    ALT (SGPT) 29 11/21/2019 10:22 AM    AST (SGOT) 17 11/21/2019 10:22 AM    Alk. phosphatase 106 11/21/2019 10:22 AM    Bilirubin, total 0.3 11/21/2019 10:22 AM    Albumin 3.1 (L) 11/21/2019 10:22 AM    Protein, total 7.1 11/21/2019 10:22 AM    PLATELET 154 02/78/1866 02:21 AM     Lab Results   Component Value Date/Time    GFR est non-AA >60 11/21/2019 10:22 AM    GFR est AA >60 11/21/2019 10:22 AM    Creatinine 0.92 11/21/2019 10:22 AM    BUN 5 (L) 11/21/2019 10:22 AM    Sodium 141 11/21/2019 10:22 AM    Potassium 4.3 11/21/2019 10:22 AM    Chloride 106 11/21/2019 10:22 AM    CO2 32 11/21/2019 10:22 AM         ASSESSMENT and PLAN    1. Type 2 DM UN controlled :  a1c is  6.4 %   From nov 2019   Compared to   6.4 %     FROM AUGUST 2019     COMPARED TO  over 9 %  From march 2019     SHE Does WELL by a1c   No low sugars since stopping glipizide     Reviewed the glucose log :  mg   Could not  tolerate  Plain metfomrin   She stopped metformin  In may 2019 - via email conversation for GI distress   She stopped glipizide in July for low sugars     STAY on  JANUVIA   She is briefly not taking victoza because she ran out of it   Discussed pros and cons of victoza incl pancreatitis and MTC    Patient is advised to check blood sugars 1-2 times daily by rotation method. reviewed medications and side effects in detail  lab results and schedule of future lab studies reviewed with patient      2. Hypoglycemia :  Educated on treating the hypoglycemia. 3. HTN : well controlled, on metoprolol . Patient is educated about importance of compliance with anti-hypertensives especially ARB/ACEI    4. Dyslipidemia : continue lipitor . Patient is educated about benefits and adverse effects of statins and explained how benefits outweigh risk.     5. use of aspirin to prevent MI and TIA's discussed      6. Obesity : Body mass index is 37.77 kg/m². 7.  Neuropathy : diagnosed at age 19   She walks with help of cane     Reviewed information about neurology procedure patient had from Dr. Jacinto Luong notes dated May 16, 2018  Patient had chemodenervation of innervated facial trigeminal cervical spine   Patient had Botox injections for chronic intractable migraine        8. PATIENT HAS H/O MEMBRANOUS GLOMERULONEPHRITIS  10 YEARS AGO, BUT WAS NOT GIVEN ANY ETIOLOGY FOR IT   ( ONE EPISODE OF NEPHROTIC SYNDROME )        9.   On HRT - estrogen    She had XIN with [facility] - march 2019   cautioned her about the estrogen and blood clots         > 50 % of time is spent on counseling   Patient voiced understanding her plan of care

## 2019-11-27 NOTE — PROGRESS NOTES
1. Have you been to the ER, urgent care clinic since your last visit? [facility]/November 2019/Stroke Symptoms  Hospitalized since your last visit? No    2. Have you seen or consulted any other health care providers outside of the [address] [place] [address] since your last visit? Include any pap smears or colon screening. No       Wt Readings from Last 3 Encounters:   11/27/19 263 lb 3.2 oz (119.4 kg)   08/15/19 262 lb 9.6 oz (119.1 kg)   05/10/19 262 lb 6.4 oz (119 kg)     Temp Readings from Last 3 Encounters:   11/27/19 97.6 °F (36.4 °C) (Oral)   08/15/19 98 °F (36.7 °C) (Oral)   05/10/19 98.2 °F (36.8 °C) (Oral)     BP Readings from Last 3 Encounters:   11/27/19 122/74   08/15/19 116/72   05/10/19 104/68     Pulse Readings from Last 3 Encounters:   11/27/19 94   08/15/19 82   05/10/19 76     Lab Results   Component Value Date/Time    Hemoglobin A1c 6.4 (H) 11/21/2019 10:22 AM     Patient has meter today.

## 2019-11-27 NOTE — LETTER
12/2/19 Patient: Brandy Amezquita YOB: 1980 Date of Visit: 11/27/2019 Phi Spain NP 
[address] [place] [address] VIA Facsimile: [phone] Dear Phi Spain NP, Thank you for referring Ms. Sabina Kiser to [id number] [place] [address] for evaluation. My notes for this consultation are attached. If you have questions, please do not hesitate to call me. I look forward to following your patient along with you. Sincerely, Tigist Ronquillo MD

## 2019-11-27 NOTE — PATIENT INSTRUCTIONS
Refills -Please call your pharmacy and have them send us a refill request. 
Results - Allow up to a week for lab results to be processed and reviewed. Phone calls - Allow up to 24 hours for non-urgent calls to be returned. Prior Authorization - May take up to 4 weeks to process depending on your insurance. Forms - FMLA, DMV, Patient Assistance, etc. will take up to 2 weeks to process. Cancellations - Please notify the office in advance if you cannot keep your appointment. Samples - Will only be dispensed at visits as supplies are limited. If you are having a medical emergency, call 911. 
 
 
-------------------------------------------------------------------------------------------------------------- On  januvia 100 mg a day VICTOZA  1.2 MG A day  
 
 
 
---------------------------------------------------------------------------------------------------------------- Do not skip meals Do not eat in between meals Reduce carbs- pasta, rice, potatoes, bread Do not drink juices or sodas, EVEN  SUGAR FREE Donot eat peanut butter Do not eat sugar free cookies and cakes Do not eat peaCHES, GRAPES, ORANGES, FRUIT MEDLEYS , RAISINS

## 2020-04-08 ENCOUNTER — TELEPHONE (OUTPATIENT)
Dept: ENDOCRINOLOGY | Age: 40
End: 2020-04-08

## 2020-04-08 NOTE — TELEPHONE ENCOUNTER
Called pt and informed her to stop taking Januvia and to continue victoza per Dr Patrizia Mcmahon. She stated an understanding

## 2020-04-24 RX ORDER — LIRAGLUTIDE 6 MG/ML
INJECTION SUBCUTANEOUS
Qty: 6 ML | Refills: 4 | Status: SHIPPED | OUTPATIENT
Start: 2020-04-24 | End: 2020-08-25

## 2020-06-06 DIAGNOSIS — E11.65 UNCONTROLLED TYPE 2 DIABETES MELLITUS WITH HYPERGLYCEMIA (HCC): ICD-10-CM

## 2020-06-07 RX ORDER — SITAGLIPTIN 100 MG/1
TABLET, FILM COATED ORAL
Qty: 30 TAB | Refills: 6 | Status: SHIPPED | OUTPATIENT
Start: 2020-06-07 | End: 2020-09-17 | Stop reason: ALTCHOICE

## 2020-07-08 ENCOUNTER — DOCUMENTATION ONLY (OUTPATIENT)
Dept: ENDOCRINOLOGY | Age: 40
End: 2020-07-08

## 2020-07-09 NOTE — PROGRESS NOTES
Insurance plan is allowing her to be on either Diane Gauss or victoza     She will stay on victoza and ask her to stop the Diane Gauss

## 2020-07-10 RX ORDER — PEN NEEDLE, DIABETIC 31 GX3/16"
NEEDLE, DISPOSABLE MISCELLANEOUS
Qty: 100 PEN NEEDLE | Refills: 3 | Status: SHIPPED | OUTPATIENT
Start: 2020-07-10 | End: 2020-09-21 | Stop reason: SDUPTHER

## 2020-08-16 RX ORDER — BLOOD SUGAR DIAGNOSTIC
STRIP MISCELLANEOUS
Qty: 100 STRIP | Refills: 4 | Status: SHIPPED | OUTPATIENT
Start: 2020-08-16 | End: 2020-09-21 | Stop reason: SDUPTHER

## 2020-08-25 RX ORDER — LIRAGLUTIDE 6 MG/ML
INJECTION SUBCUTANEOUS
Qty: 6 ML | Refills: 4 | Status: SHIPPED | OUTPATIENT
Start: 2020-08-25 | End: 2020-09-21 | Stop reason: SDUPTHER

## 2020-09-15 ENCOUNTER — HOSPITAL ENCOUNTER (EMERGENCY)
Age: 40
Discharge: HOME | End: 2020-09-16
Attending: EMERGENCY MEDICINE
Payer: MEDICAID

## 2020-09-15 ENCOUNTER — TELEPHONE (OUTPATIENT)
Dept: ENDOCRINOLOGY | Age: 40
End: 2020-09-15

## 2020-09-15 VITALS
HEIGHT: 70 IN | OXYGEN SATURATION: 100 % | SYSTOLIC BLOOD PRESSURE: 124 MMHG | BODY MASS INDEX: 36.94 KG/M2 | RESPIRATION RATE: 18 BRPM | HEART RATE: 79 BPM | TEMPERATURE: 99.7 F | WEIGHT: 258 LBS | DIASTOLIC BLOOD PRESSURE: 82 MMHG

## 2020-09-15 DIAGNOSIS — E11.65 HYPERGLYCEMIA DUE TO DIABETES MELLITUS (HCC): Primary | ICD-10-CM

## 2020-09-15 LAB
ANION GAP SERPL CALC-SCNC: 5 MMOL/L (ref 5–15)
APPEARANCE UR: CLEAR
BACTERIA URNS QL MICRO: NEGATIVE /HPF
BASOPHILS # BLD: 0 K/UL (ref 0–0.1)
BASOPHILS NFR BLD: 0 % (ref 0–1)
BILIRUB UR QL: NEGATIVE
BUN SERPL-MCNC: 8 MG/DL (ref 6–20)
BUN/CREAT SERPL: 6 (ref 12–20)
CA-I BLD-MCNC: 10 MG/DL (ref 8.5–10.1)
CHLORIDE SERPL-SCNC: 97 MMOL/L (ref 97–108)
CO2 SERPL-SCNC: 32 MMOL/L (ref 21–32)
COLOR UR: ABNORMAL
CREAT SERPL-MCNC: 1.28 MG/DL (ref 0.55–1.02)
DIFFERENTIAL METHOD BLD: NORMAL
EOSINOPHIL # BLD: 0.2 K/UL (ref 0–0.4)
EOSINOPHIL NFR BLD: 3 % (ref 0–7)
ERYTHROCYTE [DISTWIDTH] IN BLOOD BY AUTOMATED COUNT: 14.3 % (ref 11.5–14.5)
GLUCOSE BLD STRIP.AUTO-MCNC: 526 MG/DL (ref 65–100)
GLUCOSE SERPL-MCNC: 460 MG/DL (ref 65–100)
GLUCOSE UR STRIP.AUTO-MCNC: >300 MG/DL
HCT VFR BLD AUTO: 42 % (ref 35–47)
HGB BLD-MCNC: 13.5 % (ref 11.5–16)
HGB UR QL STRIP: NEGATIVE
IMM GRANULOCYTES # BLD AUTO: 0 K/UL (ref 0–0.04)
IMM GRANULOCYTES NFR BLD AUTO: 0 % (ref 0–0.5)
KETONES UR QL STRIP.AUTO: NEGATIVE MG/DL
LEUKOCYTE ESTERASE UR QL STRIP.AUTO: NEGATIVE
LYMPHOCYTES # BLD: 2.3 K/UL (ref 0.8–3.5)
LYMPHOCYTES NFR BLD: 38 % (ref 12–49)
MCH RBC QN AUTO: 28.1 PG (ref 26–34)
MCHC RBC AUTO-ENTMCNC: 32.1 G/DL (ref 30–36.5)
MCV RBC AUTO: 87.5 FL (ref 80–99)
MONOCYTES # BLD: 0.4 K/UL (ref 0–1)
MONOCYTES NFR BLD: 7 % (ref 5–13)
NEUTS SEG # BLD: 3.2 K/UL (ref 1.8–8)
NEUTS SEG NFR BLD: 52 % (ref 32–75)
NITRITE UR QL STRIP.AUTO: NEGATIVE
PERFORMED BY, TECHID: ABNORMAL
PH UR STRIP: 5 [PH] (ref 5–8)
PLATELET # BLD AUTO: 260 K/UL (ref 150–400)
PMV BLD AUTO: 10.4 FL (ref 8.9–12.9)
POTASSIUM SERPL-SCNC: 3.7 MMOL/L (ref 3.5–5.1)
PROT UR STRIP-MCNC: NEGATIVE MG/DL
RBC # BLD AUTO: 4.8 M/UL (ref 3.8–5.2)
RBC #/AREA URNS HPF: ABNORMAL /HPF (ref 0–5)
SODIUM SERPL-SCNC: 134 MMOL/L (ref 136–145)
SP GR UR REFRACTOMETRY: >1.03 (ref 1–1.03)
UROBILINOGEN UR QL STRIP.AUTO: 0.1 EU/DL (ref 0.1–1)
WBC # BLD AUTO: 6.1 K/UL (ref 3.6–11)
WBC URNS QL MICRO: ABNORMAL /HPF (ref 0–4)

## 2020-09-15 PROCEDURE — 82010 KETONE BODYS QUAN: CPT

## 2020-09-15 PROCEDURE — 82962 GLUCOSE BLOOD TEST: CPT

## 2020-09-15 PROCEDURE — 74011250636 HC RX REV CODE- 250/636: Performed by: EMERGENCY MEDICINE

## 2020-09-15 PROCEDURE — 99284 EMERGENCY DEPT VISIT MOD MDM: CPT

## 2020-09-15 PROCEDURE — 85025 COMPLETE CBC W/AUTO DIFF WBC: CPT

## 2020-09-15 PROCEDURE — 36415 COLL VENOUS BLD VENIPUNCTURE: CPT

## 2020-09-15 PROCEDURE — 81001 URINALYSIS AUTO W/SCOPE: CPT

## 2020-09-15 PROCEDURE — 80048 BASIC METABOLIC PNL TOTAL CA: CPT

## 2020-09-15 RX ADMIN — SODIUM CHLORIDE 1000 ML: 9 INJECTION, SOLUTION INTRAVENOUS at 22:58

## 2020-09-15 NOTE — TELEPHONE ENCOUNTER
Blood sugar now is 509  She states she only takes victoza when she remembers   Protein shake for breakfast, and one for lunch  She can not give any other readings   Please advise   [phone]

## 2020-09-16 LAB
GLUCOSE BLD STRIP.AUTO-MCNC: 335 MG/DL (ref 65–100)
GLUCOSE BLD STRIP.AUTO-MCNC: 393 MG/DL (ref 65–100)
PERFORMED BY, TECHID: ABNORMAL
PERFORMED BY, TECHID: ABNORMAL

## 2020-09-16 PROCEDURE — 82962 GLUCOSE BLOOD TEST: CPT

## 2020-09-16 NOTE — TELEPHONE ENCOUNTER
She went to ER y-day .  She will keep log for 3 days   Sugars improved per her       She last saw me in nov 2019   Give her a follow up next week

## 2020-09-16 NOTE — ED PROVIDER NOTES
EMERGENCY DEPARTMENT HISTORY AND PHYSICAL EXAM      Date: 9/15/2020  Patient Name: Ramin De La Rosa    History of Presenting Illness     Chief Complaint   Patient presents with    High Blood Sugar       History Provided By: Patient    HPI: Ramin De La Rosa, 41 y.o. female with a past medical history significant diabetes presents to the ED with cc of hyperglycemia. States was called by drs office for 'critical hyperglycemia'. Also reports frequent urination and general fatigue. No nausea, vomiting, abdominal pain, back pain, chest pain or any other symptoms. There are no other complaints, changes, or physical findings at this time. PCP: Demetrio Lutz NP    Current Outpatient Medications   Medication Sig Dispense Refill    Victoza 2-Franko 0.6 mg/0.1 mL (18 mg/3 mL) pnij ADMINISTER 1.2 MG UNDER THE SKIN DAILY 6 mL 4    glucose blood VI test strips (OneTouch Verio test strips) strip USE TO TEST BLOOD SUGAR ONCE DAILY 100 Strip 4    Insulin Needles, Disposable, (Marian Pen Needle) 32 gauge x 5/32\" ndle USE AS DIRECTED EVERY  Pen Needle 3    Januvia 100 mg tablet TAKE 1 TABLET BY MOUTH DAILY 30 Tab 6    lancets (ONE TOUCH DELICA) 33 gauge misc Use to check blood sugars once daily. Dx. Code E11.65 100 Lancet 4    Blood-Glucose Meter (ONETOUCH VERIO FLEX) misc Use to check blood sugars once daily. Dx. Code E11.65 1 Each 0    estradiol (ESTRACE) 1 mg tablet Take 1 mg by mouth daily. Take 2 tabs once daily      tiZANidine (ZANAFLEX) 2 mg capsule Take 2 mg by mouth every six (6) hours.  liraglutide (VICTOZA) 0.6 mg/0.1 mL (18 mg/3 mL) pnij sample 1 Pen 0    multivitamin (ONE DAILY) tablet Take 1 Tab by mouth daily.  DULoxetine (CYMBALTA) 60 mg capsule Take 60 mg by mouth daily.  metoprolol tartrate (LOPRESSOR) 100 mg IR tablet Take  by mouth two (2) times a day.  omeprazole (PRILOSEC) 20 mg capsule Take 40 mg by mouth daily.       atorvastatin (LIPITOR) 20 mg tablet Take 20 mg by mouth nightly.  acetaminophen (TYLENOL) 500 mg tablet Take 500 mg by mouth every six (6) hours as needed for Pain.  fluticasone (FLONASE) 50 mcg/actuation nasal spray 2 Sprays by Both Nostrils route once. Past History     Past Medical History:  Past Medical History:   Diagnosis Date    AR (allergic rhinitis)     Autoimmune disease ([address]     fibromyalgia    Carpal tunnel syndrome     Diabetes ([address]     Eczema     Endometriosis     Essential hypertension     GERD (gastroesophageal reflux disease)     Heartburn     Hyperlipidemia     Ill-defined condition     peripheral neuropathy    Ill-defined condition     cyst on spine    PCOS (polycystic ovarian syndrome)     Psychiatric disorder     depression    PUD (peptic ulcer disease)     Scoliosis        Past Surgical History:  Past Surgical History:   Procedure Laterality Date    BIOPSY OF KIDNEY,OPEN EXPOS  2004    HX ORTHOPAEDIC  1994    Spinal fusion    HX OTHER SURGICAL      wisdom teeth extraction       Family History:  Family History   Problem Relation Age of Onset    Asthma Sister     Cancer Maternal Grandmother         breast    Diabetes Mother     Hypertension Father     High Cholesterol Father        Social History:  Social History     Tobacco Use    Smoking status: Never Smoker    Smokeless tobacco: Never Used   Substance Use Topics    Alcohol use: No    Drug use: No       Allergies: Allergies   Allergen Reactions    Atenolol Other (comments)     Muscle weakness      Bactrim [Sulfamethoxazole-Trimethoprim] Hives    Lisinopril Hives    Omnicef [Cefdinir] Diarrhea    Toprol Xl [Metoprolol Succinate] Other (comments)     nightmares         Review of Systems   Review of Systems   Constitutional: Positive for fatigue. Negative for appetite change and fever. HENT: Negative for congestion, ear pain, sinus pressure, sinus pain and sore throat. Eyes: Negative for redness and visual disturbance. Respiratory: Negative for cough, chest tightness and shortness of breath. Cardiovascular: Negative for chest pain, palpitations and leg swelling. Gastrointestinal: Negative for abdominal pain, diarrhea, nausea and vomiting. Endocrine: Positive for polydipsia, polyphagia and polyuria. Genitourinary: Negative for dysuria and flank pain. Musculoskeletal: Negative for arthralgias, back pain, gait problem and myalgias. Skin: Negative for rash. Neurological: Negative for dizziness, speech difficulty, light-headedness, numbness and headaches. Psychiatric/Behavioral: Negative for suicidal ideas. The patient is not nervous/anxious. All other systems reviewed and are negative. Physical Exam   Physical Exam  Vitals signs and nursing note reviewed. Constitutional:       General: She is not in acute distress. Appearance: Normal appearance. She is not ill-appearing. HENT:      Head: Normocephalic and atraumatic. Nose: Nose normal.      Mouth/Throat:      Mouth: Mucous membranes are moist.   Eyes:      Pupils: Pupils are equal, round, and reactive to light. Neck:      Musculoskeletal: Normal range of motion and neck supple. Cardiovascular:      Rate and Rhythm: Normal rate and regular rhythm. Pulses: Normal pulses. Heart sounds: Normal heart sounds. Pulmonary:      Effort: Pulmonary effort is normal.      Breath sounds: Normal breath sounds. Abdominal:      General: Abdomen is flat. Bowel sounds are normal.      Palpations: Abdomen is soft. Musculoskeletal: Normal range of motion. Skin:     General: Skin is warm and dry. Capillary Refill: Capillary refill takes less than 2 seconds. Neurological:      General: No focal deficit present. Mental Status: She is alert.    Psychiatric:         Mood and Affect: Mood normal.         Diagnostic Study Results     Labs -     Recent Results (from the past 12 hour(s))   GLUCOSE, POC    Collection Time: 09/15/20  8:44 PM Result Value Ref Range    Glucose (POC) 526 (H) 65 - 100 mg/dL    Performed by Jocelyn Bianchi    CBC WITH AUTOMATED DIFF    Collection Time: 09/15/20  9:00 PM   Result Value Ref Range    WBC 6.1 3.6 - 11.0 K/uL    RBC 4.80 3.80 - 5.20 M/uL    HGB 13.5 11.5 - 16.0 %    HCT 42.0 35.0 - 47.0 %    MCV 87.5 80.0 - 99.0 FL    MCH 28.1 26.0 - 34.0 PG    MCHC 32.1 30.0 - 36.5 g/dL    RDW 14.3 11.5 - 14.5 %    PLATELET 184 022 - 261 K/uL    MPV 10.4 8.9 - 12.9 FL    NEUTROPHILS 52 32 - 75 %    LYMPHOCYTES 38 12 - 49 %    MONOCYTES 7 5 - 13 %    EOSINOPHILS 3 0 - 7 %    BASOPHILS 0 0 - 1 %    IMMATURE GRANULOCYTES 0 0.0 - 0.5 %    ABS. NEUTROPHILS 3.2 1.8 - 8.0 K/UL    ABS. LYMPHOCYTES 2.3 0.8 - 3.5 K/UL    ABS. MONOCYTES 0.4 0.0 - 1.0 K/UL    ABS. EOSINOPHILS 0.2 0.0 - 0.4 K/UL    ABS. BASOPHILS 0.0 0.0 - 0.1 K/UL    ABS. IMM.  GRANS. 0.0 0.00 - 0.04 K/UL    DF AUTOMATED     METABOLIC PANEL, BASIC    Collection Time: 09/15/20  9:00 PM   Result Value Ref Range    Sodium 134 (L) 136 - 145 mmol/L    Potassium 3.7 3.5 - 5.1 mmol/L    Chloride 97 97 - 108 mmol/L    CO2 32 21 - 32 mmol/L    Anion gap 5 5 - 15 mmol/L    Glucose 460 (H) 65 - 100 mg/dL    BUN 8 6 - 20 mg/dL    Creatinine 1.28 (H) 0.55 - 1.02 mg/dL    BUN/Creatinine ratio 6 (L) 12 - 20      GFR est AA 55 (L) >60 ml/min/1.73m2    GFR est non-AA 46 (L) >60 ml/min/1.73m2    Calcium 10.0 8.5 - 10.1 mg/dL   URINALYSIS W/MICROSCOPIC    Collection Time: 09/15/20  9:00 PM   Result Value Ref Range    Color Yellow/Straw      Appearance Clear Clear      Specific gravity >1.030 (H) 1.003 - 1.030    pH (UA) 5.0 5.0 - 8.0      Protein Negative Negative mg/dL    Glucose >300 (A) Negative mg/dL    Ketone Negative Negative mg/dL    Bilirubin Negative Negative      Blood Negative Negative      Urobilinogen 0.1 0.1 - 1.0 EU/dL    Nitrites Negative Negative      Leukocyte Esterase Negative Negative      WBC 0-4 0 - 4 /hpf    RBC 0-5 0 - 5 /hpf    Bacteria Negative Negative /hpf GLUCOSE, POC    Collection Time: 09/16/20 12:35 AM   Result Value Ref Range    Glucose (POC) 393 (H) 65 - 100 mg/dL    Performed by Torsten Souza        Radiologic Studies -   No orders to display     CT Results  (Last 48 hours)    None        CXR Results  (Last 48 hours)    None          Medical Decision Making and ED Course   I am the first provider for this patient. I reviewed the vital signs, available nursing notes, past medical history, past surgical history, family history and social history. Vital Signs-Reviewed the patient's vital signs. Patient Vitals for the past 12 hrs:   Temp Pulse Resp BP SpO2   09/15/20 2323 -- 79 18 124/82 100 %   09/15/20 2041 99.7 °F (37.6 °C) (!) 107 18 130/89 100 %          Records Reviewed: Nursing Notes    Provider Notes (Medical Decision Making):   Patient is a 76-year-old female presents the ED with elevated blood sugars. She had does note some increased fatigue. She states she was stopped taking Januvia in July due to cost restraints and has tried other medications including metformin without much relief. She does take Victoza which she has been taking for some time now. But she admits to forgetting to take it sometimes. She states she has not regularly checked her blood sugar since July. Blood sugar usually runs between 130s to 170s and here in the last couple of days has been in the 500s. Patient is otherwise stable relatively asymptomatic hyperglycemia. Will give 2 L of IV fluids and check labs but expect they will be unremarkable will discharge to home with close outpatient PCP follow-up         ED Course:   Initial assessment performed. The patients presenting problems have been discussed, and they are in agreement with the care plan formulated and outlined with them. I have encouraged them to ask questions as they arise throughout their visit.     ED Course as of Sep 16 0152   Wed Sep 16, 2020   0022 URINALYSIS W/MICROSCOPIC(!):    Color Yellow/Straw Appearance Clear   Specific gravity >1.030(!)   pH (UA) 5.0   Protein Negative   Glucose >300(!)   Ketone Negative   Bilirubin Negative   Blood Negative   Urobilinogen 0.1   Nitrites Negative   Leukocyte Esterase Negative   WBC 0-4   RBC 0-5   Bacteria Negative [MC]   6580 METABOLIC PANEL, BASIC(!):    Sodium 134(!)   Potassium 3.7   Chloride 97   CO2 32   Anion gap 5   Glucose 460(!)   BUN 8   Creatinine 1.28(!)   BUN/Creatinine ratio 6(!)   GFR est AA 55(!)   GFR est non-AA 46(!)   Calcium 10.0 [MC]   0022 CBC WITH AUTOMATED DIFF:    WBC 6.1   RBC 4.80   HGB 13.5   HCT 42.0   MCV 87.5   MCH 28.1   MCHC 32.1   RDW 14.3   PLATELET 389   MPV 49.4   NEUTROPHILS 52   LYMPHOCYTES 38   MONOCYTES 7   EOSINOPHILS 3   BASOPHILS 0   IMMATURE GRANULOCYTES 0   ABS. NEUTROPHILS 3.2   ABS. LYMPHOCYTES 2.3   ABS. MONOCYTES 0.4   ABS. EOSINOPHILS 0.2   ABS. BASOPHILS 0.0   ABS. IMM. GRANS. 0.0   DF AUTOMATED [MC]   0022 URINALYSIS W/MICROSCOPIC(!):    Color Yellow/Straw   Appearance Clear   Specific gravity >1.030(!)   pH (UA) 5.0   Protein Negative   Glucose >300(!)   Ketone Negative   Bilirubin Negative   Blood Negative   Urobilinogen 0.1   Nitrites Negative   Leukocyte Esterase Negative   WBC 0-4   RBC 0-5   Bacteria Negative [MC]   0022 GLUCOSE,FAST - POC(!): 526 [MC]   0025 Specific gravity(!): >1.030 [MC]   0025 Glucose(!): >300 [MC]   0025 Sodium(!): 134 [MC]   0025 Glucose(!): 460 [MC]   0025 Creatinine(!): 1.28 [MC]   0025 BUN/Creatinine ratio(!): 6 [MC]   0025 GFR est AA(!): 55 [MC]   0025 Blood sugars improving her creatinine is actually improved from 2019 with a GFR improving from 30 to 40%. GFR est non-AA(!): 46 [MC]      ED Course User Index  [MC] Zachary Larkin MD             Disposition     Discharge to home with follow-up with primary care doctor. DISCHARGE PLAN:  1. Current Discharge Medication List      CONTINUE these medications which have NOT CHANGED    Details   !!  Victoza 2-Franko 0.6 mg/0.1 mL (18 mg/3 mL) pnij ADMINISTER 1.2 MG UNDER THE SKIN DAILY  Qty: 6 mL, Refills: 4      glucose blood VI test strips (OneTouch Verio test strips) strip USE TO TEST BLOOD SUGAR ONCE DAILY  Qty: 100 Strip, Refills: 4      Insulin Needles, Disposable, (Marian Pen Needle) 32 gauge x 5/32\" ndle USE AS DIRECTED EVERY DAY  Qty: 100 Pen Needle, Refills: 3      Januvia 100 mg tablet TAKE 1 TABLET BY MOUTH DAILY  Qty: 30 Tab, Refills: 6    Associated Diagnoses: Uncontrolled type 2 diabetes mellitus with hyperglycemia (HCC)      lancets (ONE TOUCH DELICA) 33 gauge misc Use to check blood sugars once daily. Dx. Code E11.65  Qty: 100 Lancet, Refills: 4      Blood-Glucose Meter (ONETOUCH VERIO FLEX) misc Use to check blood sugars once daily. Dx. Code E11.65  Qty: 1 Each, Refills: 0      estradiol (ESTRACE) 1 mg tablet Take 1 mg by mouth daily. Take 2 tabs once daily      tiZANidine (ZANAFLEX) 2 mg capsule Take 2 mg by mouth every six (6) hours. !! liraglutide (VICTOZA) 0.6 mg/0.1 mL (18 mg/3 mL) pnij sample  Qty: 1 Pen, Refills: 0      multivitamin (ONE DAILY) tablet Take 1 Tab by mouth daily. DULoxetine (CYMBALTA) 60 mg capsule Take 60 mg by mouth daily. metoprolol tartrate (LOPRESSOR) 100 mg IR tablet Take  by mouth two (2) times a day. omeprazole (PRILOSEC) 20 mg capsule Take 40 mg by mouth daily. atorvastatin (LIPITOR) 20 mg tablet Take 20 mg by mouth nightly. acetaminophen (TYLENOL) 500 mg tablet Take 500 mg by mouth every six (6) hours as needed for Pain. fluticasone (FLONASE) 50 mcg/actuation nasal spray 2 Sprays by Both Nostrils route once. !! - Potential duplicate medications found. Please discuss with provider.         2.   Follow-up Information     Follow up With Specialties Details Why Contact Info    Claudell Merlin, NP Nurse Practitioner Schedule an appointment as soon as possible for a visit in 3 days  [address] [place] [id number]  [phone]          3. Return to ED if worse     Diagnosis     Clinical Impression:   1. Hyperglycemia due to diabetes mellitus [facility])        Attestations:    Emery Morales MD    Please note that this dictation was completed with [employer], the computer voice recognition software. Quite often unanticipated grammatical, syntax, homophones, and other interpretive errors are inadvertently transcribed by the computer software. Please disregard these errors. Please excuse any errors that have escaped final proofreading. Thank you.

## 2020-09-17 ENCOUNTER — OFFICE VISIT (OUTPATIENT)
Dept: ENDOCRINOLOGY | Age: 40
End: 2020-09-17
Payer: MEDICAID

## 2020-09-17 VITALS
SYSTOLIC BLOOD PRESSURE: 110 MMHG | DIASTOLIC BLOOD PRESSURE: 84 MMHG | RESPIRATION RATE: 16 BRPM | HEART RATE: 106 BPM | TEMPERATURE: 98.5 F | HEIGHT: 70 IN | WEIGHT: 256 LBS | BODY MASS INDEX: 36.65 KG/M2

## 2020-09-17 DIAGNOSIS — E11.65 UNCONTROLLED TYPE 2 DIABETES MELLITUS WITH HYPERGLYCEMIA (HCC): Primary | ICD-10-CM

## 2020-09-17 DIAGNOSIS — E78.2 MIXED HYPERLIPIDEMIA: ICD-10-CM

## 2020-09-17 DIAGNOSIS — I10 ESSENTIAL HYPERTENSION: ICD-10-CM

## 2020-09-17 LAB — HBA1C MFR BLD HPLC: 11.5 %

## 2020-09-17 PROCEDURE — 83036 HEMOGLOBIN GLYCOSYLATED A1C: CPT | Performed by: INTERNAL MEDICINE

## 2020-09-17 PROCEDURE — 99215 OFFICE O/P EST HI 40 MIN: CPT | Performed by: INTERNAL MEDICINE

## 2020-09-17 RX ORDER — BLOOD-GLUCOSE METER
EACH MISCELLANEOUS
Qty: 1 EACH | Refills: 0 | Status: SHIPPED | OUTPATIENT
Start: 2020-09-17 | End: 2020-09-21 | Stop reason: SDUPTHER

## 2020-09-17 RX ORDER — GLIPIZIDE 5 MG/1
TABLET ORAL
Qty: 60 TAB | Refills: 2 | Status: SHIPPED | OUTPATIENT
Start: 2020-09-17 | End: 2020-09-21 | Stop reason: SDUPTHER

## 2020-09-17 NOTE — PROGRESS NOTES
HISTORY OF PRESENT ILLNESS  Colonel Ramires is a 41 y.o. female. HPI   Patient here for FOLLOW UP AFTER   Last  visit of Type 2 diabetes mellitus  From November 2019     She called c/o very high sugars and I realized that she has not seen me for over a year   She has gotten very non compliant with diet because of pandemic           Nov 2019  :     Pt reports having s/s of stroke/TIA in nov 2019   She reports having headache, dropping face on right side , and was found to have elevated  /120 and blood sugar was 107 mg   She went to ER   Was told it was migraine   She has an appt with neurologist  Soon           Old history :     WEIGHT IS STABLE   SHE HAS DONE VERY WELL   SUGARS ARE GREAT ON LOG   SHE HAS DONE BY PLANNING MEALS  FOR NEXT DAY   SHE HAS STOPPED FAST FOODS   She stopped metformin  In may 2019 - via email conversation   She stopped glipizide in July for low sugars             OLD HISTORY  :   Referred : by self/pcp    H/o diabetes for 3 years     She wants to get care done as she saw her mother doing well with diabetes. Current A1C is over 9 %   From march 2019  and symptoms/problems include high sugars    Current diabetic medications include glipizide  Er 5 mg once  A day .    Current monitoring regimen: home blood tests - daily  Home blood sugar records: trend: fluctuating a lot  Any episodes of hypoglycemia? no  Weight trend: increasing rapidly  Prior visit with dietician: no  Current diet: \"unhealthy\" diet in general  Current exercise: no regular exercise  Known diabetic complications: peripheral neuropathy  Cardiovascular risk factors: dyslipidemia, diabetes mellitus, obesity, sedentary life style, hypertension, stress  Eye exam current (within one year): no  RENETTA: unknown     Past Medical History:   Diagnosis Date    AR (allergic rhinitis)     Autoimmune disease ([address]     fibromyalgia    Carpal tunnel syndrome     Diabetes [facility]     Eczema     Endometriosis     Essential hypertension     GERD (gastroesophageal reflux disease)     Heartburn     Hyperlipidemia     Ill-defined condition     peripheral neuropathy    Ill-defined condition     cyst on spine    PCOS (polycystic ovarian syndrome)     Psychiatric disorder     depression    PUD (peptic ulcer disease)     Scoliosis      Past Surgical History:   Procedure Laterality Date    BIOPSY OF KIDNEY,OPEN EXPOS  2004    HX ORTHOPAEDIC  1994    Spinal fusion    HX OTHER SURGICAL      wisdom teeth extraction     Current Outpatient Medications   Medication Sig    Victoza 2-Franko 0.6 mg/0.1 mL (18 mg/3 mL) pnij ADMINISTER 1.2 MG UNDER THE SKIN DAILY    multivitamin (ONE DAILY) tablet Take 1 Tab by mouth daily.  DULoxetine (CYMBALTA) 60 mg capsule Take 60 mg by mouth daily.  metoprolol tartrate (LOPRESSOR) 100 mg IR tablet Take  by mouth two (2) times a day.  atorvastatin (LIPITOR) 20 mg tablet Take 20 mg by mouth nightly.  acetaminophen (TYLENOL) 500 mg tablet Take 500 mg by mouth every six (6) hours as needed for Pain.  glucose blood VI test strips (OneTouch Verio test strips) strip USE TO TEST BLOOD SUGAR ONCE DAILY    Insulin Needles, Disposable, (Marian Pen Needle) 32 gauge x 5/32\" ndle USE AS DIRECTED EVERY DAY    Januvia 100 mg tablet TAKE 1 TABLET BY MOUTH DAILY    lancets (ONE TOUCH DELICA) 33 gauge misc Use to check blood sugars once daily. Dx. Code E11.65    Blood-Glucose Meter (ONETOUCH VERIO FLEX) misc Use to check blood sugars once daily. Dx. Code E11.65    estradiol (ESTRACE) 1 mg tablet Take 1 mg by mouth daily. Take 2 tabs once daily    tiZANidine (ZANAFLEX) 2 mg capsule Take 2 mg by mouth every six (6) hours.  liraglutide (VICTOZA) 0.6 mg/0.1 mL (18 mg/3 mL) pnij sample    omeprazole (PRILOSEC) 20 mg capsule Take 40 mg by mouth daily.  fluticasone (FLONASE) 50 mcg/actuation nasal spray 2 Sprays by Both Nostrils route once. No current facility-administered medications for this visit. Review of Systems   Constitutional: Negative. HENT: Negative. Eyes: Negative. Respiratory: Negative. Cardiovascular: Negative. Gastrointestinal: Negative. Genitourinary: Negative. Musculoskeletal: Negative. Skin: Negative. Neurological: Negative. Endo/Heme/Allergies: Negative. Psychiatric/Behavioral: Negative. Physical Exam   Constitutional: She is oriented to person, place, and time. She appears well-developed and well-nourished. HENT:   Head: Normocephalic. Eyes: Pupils are equal, round, and reactive to light. Conjunctivae and EOM are normal.   Neck: Normal range of motion. Neck supple. Cardiovascular: Normal rate and regular rhythm. Pulmonary/Chest: Effort normal and breath sounds normal.   Abdominal: Soft. Bowel sounds are normal.   Musculoskeletal: Normal range of motion. Neurological: She is alert and oriented to person, place, and time. She has normal reflexes. Skin: Skin is warm and dry. Psychiatric: She has a normal mood and affect. Lab Results   Component Value Date/Time    Hemoglobin A1c 6.4 (H) 11/21/2019 10:22 AM    Hemoglobin A1c 6.4 (H) 08/09/2019 11:20 AM    Hemoglobin A1c 9.2 (H) 03/14/2019 02:21 AM    Glucose 460 (H) 09/15/2020 09:00 PM    Glucose (POC) 335 (H) 09/16/2020 02:32 AM    Microalbumin/Creat ratio (mg/g creat)  11/21/2019 10:22 AM     Cannot calculate ratio due to microalbumin result outside reportable range. Microalbumin,urine random <0.50 11/21/2019 10:22 AM    LDL, calculated 87.6 11/21/2019 10:22 AM    Creatinine 1.28 (H) 09/15/2020 09:00 PM      Lab Results   Component Value Date/Time    Cholesterol, total 170 11/21/2019 10:22 AM    HDL Cholesterol 55 11/21/2019 10:22 AM    LDL, calculated 87.6 11/21/2019 10:22 AM    LDL-C, External 96 02/11/2019    Triglyceride 137 11/21/2019 10:22 AM    CHOL/HDL Ratio 3.1 11/21/2019 10:22 AM     Lab Results   Component Value Date/Time    ALT (SGPT) 29 11/21/2019 10:22 AM    Alk. phosphatase 106 11/21/2019 10:22 AM    Bilirubin, total 0.3 11/21/2019 10:22 AM    Albumin 3.1 (L) 11/21/2019 10:22 AM    Protein, total 7.1 11/21/2019 10:22 AM    PLATELET 723 31/31/9421 09:00 PM     Lab Results   Component Value Date/Time    GFR est non-AA 46 (L) 09/15/2020 09:00 PM    GFR est AA 55 (L) 09/15/2020 09:00 PM    Creatinine 1.28 (H) 09/15/2020 09:00 PM    BUN 8 09/15/2020 09:00 PM    Sodium 134 (L) 09/15/2020 09:00 PM    Potassium 3.7 09/15/2020 09:00 PM    Chloride 97 09/15/2020 09:00 PM    CO2 32 09/15/2020 09:00 PM         ASSESSMENT and PLAN    1. Type 2 DM UN controlled :  a1c is   Over 11  %    From today  Sept 2020    Compared to   6.4 %   From nov 2019   Compared to   6.4 %     FROM AUGUST 2019     COMPARED TO  over 9 %  From march 2019 Sept 2020      Lost control   She had corona virus  Around her b-day time and then whole family fell sick   She went into depression and is seeing psychologist   Only on [email or web address] thus far as she has done very well in managing diabetes    and today, I am starting  on glipizide  Asked her to call me if blood sugars are above 250mg           Nov 2020      SHE Does WELL by a1c   No low sugars since stopping glipizide     Reviewed the glucose log :  mg   Could not  tolerate  Plain metfomrin   She stopped metformin  In may 2019 - via email conversation for GI distress   She stopped glipizide in July for low sugars     STAY on  JANUVIA   She is briefly not taking victoza because she ran out of it   Discussed pros and cons of victoza incl pancreatitis and MTC    Patient is advised to check blood sugars 1-2 times daily by rotation method. reviewed medications and side effects in detail  lab results and schedule of future lab studies reviewed with patient      2. Hypoglycemia :  Educated on treating the hypoglycemia. 3. HTN : well controlled, on metoprolol .  Patient is educated about importance of compliance with anti-hypertensives especially ARB/ACEI    4. Dyslipidemia : continue lipitor . Patient is educated about benefits and adverse effects of statins and explained how benefits outweigh risk. 5. use of aspirin to prevent MI and TIA's discussed      6. Obesity : Body mass index is 36.73 kg/m². 7.  Neuropathy : diagnosed at age 19   She walks with help of cane   Reviewed information about neurology procedure patient had from Dr. Shanon Cano notes dated May 16, 2018  Patient had chemodenervation of innervated facial trigeminal cervical spine   Patient had Botox injections for chronic intractable migraine        8. PATIENT HAS H/O MEMBRANOUS GLOMERULONEPHRITIS  10 YEARS AGO, BUT WAS NOT GIVEN ANY ETIOLOGY FOR IT   ( ONE EPISODE OF NEPHROTIC SYNDROME )        9.   On HRT - estrogen    She had XIN with [facility] - march 2019   cautioned her about the estrogen and blood clots          > 50 % of time is spent on counseling   Patient voiced understanding her plan of care

## 2020-09-17 NOTE — PROGRESS NOTES
Wt Readings from Last 3 Encounters:   09/17/20 256 lb (116.1 kg)   09/15/20 258 lb (117 kg)   11/27/19 263 lb 3.2 oz (119.4 kg)     Temp Readings from Last 3 Encounters:   09/17/20 98.5 °F (36.9 °C) (Oral)   09/15/20 99.7 °F (37.6 °C)   11/27/19 97.6 °F (36.4 °C) (Oral)     BP Readings from Last 3 Encounters:   09/17/20 110/84   09/15/20 124/82   11/27/19 122/74     Pulse Readings from Last 3 Encounters:   09/17/20 (!) 106   09/15/20 79   11/27/19 94     Lab Results   Component Value Date/Time    Hemoglobin A1c 6.4 (H) 11/21/2019 10:22 AM

## 2020-09-17 NOTE — PATIENT INSTRUCTIONS
Refills -Please call your pharmacy and have them send us a refill request. 
Results - Allow up to a week for lab results to be processed and reviewed. Phone calls - Allow up to 24 hours for non-urgent calls to be returned. Prior Authorization - May take up to 4 weeks to process depending on your insurance. Forms - FMLA, DMV, Patient Assistance, etc. will take up to 2 weeks to process. Cancellations - Please notify the office in advance if you cannot keep your appointment. Samples - Will only be dispensed at visits as supplies are limited. If you are having a medical emergency, call 911. 
 
 
-------------------------------------------------------------------------------------------------------------- Start on glipizide  5 mg  Twenty minutes before  b-fast and dinner Do not take it if you are not eating Cut the pill to half if eating lesser than normal  
Do not avoid lunches VICTOZA  1.2 MG A day  
 
 
 
---------------------------------------------------------------------------------------------------------------- [address] [employer] [facility] Do not skip meals Do not eat in between meals Reduce carbs- pasta, rice, potatoes, bread Do not drink juices or sodas, even they are calorie zero or diet drinks Do not eat peanut butter Do not eat sugar free cookies and cakes Do not eat peaches, oranges, pineapples, raisins, grapes , canteloupe , honey dew and fruit medleys

## 2020-09-21 RX ORDER — LANCETS 33 GAUGE
EACH MISCELLANEOUS
Qty: 100 LANCET | Refills: 4 | Status: SHIPPED | OUTPATIENT
Start: 2020-09-21 | End: 2020-09-22 | Stop reason: SDUPTHER

## 2020-09-21 RX ORDER — GLIPIZIDE 5 MG/1
TABLET ORAL
Qty: 60 TAB | Refills: 6 | Status: SHIPPED | OUTPATIENT
Start: 2020-09-21 | End: 2020-12-14

## 2020-09-21 RX ORDER — BLOOD SUGAR DIAGNOSTIC
STRIP MISCELLANEOUS
Qty: 100 STRIP | Refills: 4 | Status: SHIPPED | OUTPATIENT
Start: 2020-09-21 | End: 2021-09-20 | Stop reason: ALTCHOICE

## 2020-09-21 RX ORDER — LIRAGLUTIDE 6 MG/ML
INJECTION SUBCUTANEOUS
Qty: 6 ML | Refills: 6 | Status: SHIPPED | OUTPATIENT
Start: 2020-09-21 | End: 2021-03-22 | Stop reason: ALTCHOICE

## 2020-09-21 RX ORDER — PEN NEEDLE, DIABETIC 31 GX3/16"
NEEDLE, DISPOSABLE MISCELLANEOUS
Qty: 100 PEN NEEDLE | Refills: 3 | Status: SHIPPED | OUTPATIENT
Start: 2020-09-21 | End: 2021-09-20

## 2020-09-21 RX ORDER — BLOOD-GLUCOSE METER
EACH MISCELLANEOUS
Qty: 1 EACH | Refills: 0 | Status: SHIPPED | OUTPATIENT
Start: 2020-09-21 | End: 2021-09-20

## 2020-09-21 NOTE — TELEPHONE ENCOUNTER
----- Message from Valeri Woo sent at 9/18/2020  7:46 PM EDT -----  Regarding: Prescription Question  Contact: [phone]  My pharmacy ([facility]) no longer takes my insurance. I had all my prescriptions transferred to     [facility] , [address]  [phone]    But the scripts you sent to [facility] were not on the list. Could you please resubmit to [address] [facility]. Thank you!

## 2020-09-22 RX ORDER — LANCETS 33 GAUGE
EACH MISCELLANEOUS
Qty: 100 LANCET | Refills: 4 | Status: SHIPPED | OUTPATIENT
Start: 2020-09-22 | End: 2021-09-20 | Stop reason: ALTCHOICE

## 2020-09-24 ENCOUNTER — TELEPHONE (OUTPATIENT)
Dept: ENDOCRINOLOGY | Age: 40
End: 2020-09-24

## 2020-09-24 NOTE — TELEPHONE ENCOUNTER
----- Message from Samuel Dyer sent at 9/24/2020 11:04 AM EDT -----  Regarding: Prescription Question  Contact: [phone]  Good morning,    The script sent to the pharmacy was for the wrong test strips. I have the Onetouch Ultra 2 meter. Every thing else was correct.     Thank you and have a wonderful and blessed day!!!!!!

## 2020-11-14 LAB
ALBUMIN SERPL-MCNC: 4.4 G/DL (ref 3.8–4.8)
ALBUMIN/CREAT UR: 5 MG/G CREAT (ref 0–29)
ALBUMIN/GLOB SERPL: 1.7 {RATIO} (ref 1.2–2.2)
ALP SERPL-CCNC: 85 IU/L (ref 39–117)
ALT SERPL-CCNC: 22 IU/L (ref 0–32)
AST SERPL-CCNC: 17 IU/L (ref 0–40)
BILIRUB SERPL-MCNC: 0.4 MG/DL (ref 0–1.2)
BUN SERPL-MCNC: 11 MG/DL (ref 6–24)
BUN/CREAT SERPL: 10 (ref 9–23)
C PEPTIDE SERPL-MCNC: 3.2 NG/ML (ref 1.1–4.4)
CALCIUM SERPL-MCNC: 9.8 MG/DL (ref 8.7–10.2)
CHLORIDE SERPL-SCNC: 99 MMOL/L (ref 96–106)
CHOLEST SERPL-MCNC: 184 MG/DL (ref 100–199)
CO2 SERPL-SCNC: 26 MMOL/L (ref 20–29)
CREAT SERPL-MCNC: 1.08 MG/DL (ref 0.57–1)
CREAT UR-MCNC: 186 MG/DL
GAD65 AB SER IA-ACNC: <5 U/ML (ref 0–5)
GLOBULIN SER CALC-MCNC: 2.6 G/DL (ref 1.5–4.5)
GLUCOSE SERPL-MCNC: 157 MG/DL (ref 65–99)
HDLC SERPL-MCNC: 45 MG/DL
INTERPRETATION, 910389: NORMAL
LDLC SERPL CALC-MCNC: 111 MG/DL (ref 0–99)
Lab: NORMAL
MICROALBUMIN UR-MCNC: 8.8 UG/ML
POTASSIUM SERPL-SCNC: 4.1 MMOL/L (ref 3.5–5.2)
PROT SERPL-MCNC: 7 G/DL (ref 6–8.5)
SODIUM SERPL-SCNC: 141 MMOL/L (ref 134–144)
TRIGL SERPL-MCNC: 157 MG/DL (ref 0–149)
VLDLC SERPL CALC-MCNC: 28 MG/DL (ref 5–40)

## 2020-11-25 ENCOUNTER — OFFICE VISIT (OUTPATIENT)
Dept: ENDOCRINOLOGY | Age: 40
End: 2020-11-25
Payer: MEDICAID

## 2020-11-25 VITALS
RESPIRATION RATE: 18 BRPM | SYSTOLIC BLOOD PRESSURE: 105 MMHG | HEIGHT: 70 IN | OXYGEN SATURATION: 97 % | BODY MASS INDEX: 38.77 KG/M2 | DIASTOLIC BLOOD PRESSURE: 69 MMHG | WEIGHT: 270.8 LBS | HEART RATE: 84 BPM | TEMPERATURE: 98.1 F

## 2020-11-25 DIAGNOSIS — E11.65 UNCONTROLLED TYPE 2 DIABETES MELLITUS WITH HYPERGLYCEMIA (HCC): Primary | ICD-10-CM

## 2020-11-25 DIAGNOSIS — E78.2 MIXED HYPERLIPIDEMIA: ICD-10-CM

## 2020-11-25 DIAGNOSIS — I10 ESSENTIAL HYPERTENSION: ICD-10-CM

## 2020-11-25 PROCEDURE — 99214 OFFICE O/P EST MOD 30 MIN: CPT | Performed by: INTERNAL MEDICINE

## 2020-11-25 RX ORDER — DILTIAZEM HYDROCHLORIDE 60 MG/1
60 CAPSULE, EXTENDED RELEASE ORAL 2 TIMES DAILY
COMMUNITY
End: 2021-03-22 | Stop reason: ALTCHOICE

## 2020-11-25 NOTE — LETTER
11/26/20 Patient: Jenn Cordova YOB: 1980 Date of Visit: 11/25/2020 Esther Ortiz NP 
[address] [address] VIA Facsimile: [phone] Dear Esther Ortiz NP, Thank you for referring Ms. Machelle Trevino to [id number] [place] [address] for evaluation. My notes for this consultation are attached. If you have questions, please do not hesitate to call me. I look forward to following your patient along with you. Sincerely, Uriah Mai MD

## 2020-11-25 NOTE — PATIENT INSTRUCTIONS
Refills -Please call your pharmacy and have them send us a refill request. 
Results - Allow up to a week for lab results to be processed and reviewed. Phone calls - Allow up to 24 hours for non-urgent calls to be returned. Prior Authorization - May take up to 4 weeks to process depending on your insurance. Forms - FMLA, DMV, Patient Assistance, etc. will take up to 2 weeks to process. Cancellations - Please notify the office in advance if you cannot keep your appointment. Samples - Will only be dispensed at visits as supplies are limited. If you are having a medical emergency, call 911. 
 
 
-------------------------------------------------------------------------------------------------------------- Stay  on glipizide  5 mg  Twenty minutes before  b-fast and dinner Do not take it if you are not eating Cut the pill to half if eating lesser than normal  
Do not avoid lunches VICTOZA  1.2 MG A day  
 
 
 
---------------------------------------------------------------------------------------------------------------- [address] [employer] [facility] Do not skip meals Do not eat in between meals Reduce carbs- pasta, rice, potatoes, bread Do not drink juices or sodas, even they are calorie zero or diet drinks Do not eat peanut butter Do not eat sugar free cookies and cakes Do not eat peaches, oranges, pineapples, raisins, grapes , canteloupe , honey dew and fruit medleys

## 2020-11-25 NOTE — PROGRESS NOTES
HISTORY OF PRESENT ILLNESS  Tish Marley is a 41 y.o. female. HPI   Patient here for FOLLOW UP AFTER   Last  visit of Type 2 diabetes mellitus  From November 2019     She has done well   She has gained  Weight   Sugars are good       Sept 2020     She called c/o very high sugars and I realized that she has not seen me for over a year   She has gotten very non compliant with diet because of pandemic         Nov 2019  :     Pt reports having s/s of stroke/TIA in nov 2019   She reports having headache, dropping face on right side , and was found to have elevated  /120 and blood sugar was 107 mg   She went to ER   Was told it was migraine   She has an appt with neurologist  Soon           Old history :     WEIGHT IS STABLE   SHE HAS DONE VERY WELL   SUGARS ARE GREAT ON LOG   SHE HAS DONE BY PLANNING MEALS  FOR NEXT DAY   SHE HAS STOPPED FAST FOODS   She stopped metformin  In may 2019 - via email conversation   She stopped glipizide in July for low sugars             OLD HISTORY  :   Referred : by self/pcp    H/o diabetes for 3 years     She wants to get care done as she saw her mother doing well with diabetes. Current A1C is over 9 %   From march 2019  and symptoms/problems include high sugars    Current diabetic medications include glipizide  Er 5 mg once  A day .    Current monitoring regimen: home blood tests - daily  Home blood sugar records: trend: fluctuating a lot  Any episodes of hypoglycemia? no  Weight trend: increasing rapidly  Prior visit with dietician: no  Current diet: \"unhealthy\" diet in general  Current exercise: no regular exercise  Known diabetic complications: peripheral neuropathy  Cardiovascular risk factors: dyslipidemia, diabetes mellitus, obesity, sedentary life style, hypertension, stress  Eye exam current (within one year): no  RENETTA: unknown     Past Medical History:   Diagnosis Date    AR (allergic rhinitis)     Autoimmune disease (HCC)     fibromyalgia    Carpal tunnel syndrome     Diabetes ([facility] [address]     Eczema     Endometriosis     Essential hypertension     GERD (gastroesophageal reflux disease)     Heartburn     Hyperlipidemia     Ill-defined condition     peripheral neuropathy    Ill-defined condition     cyst on spine    PCOS (polycystic ovarian syndrome)     Psychiatric disorder     depression    PUD (peptic ulcer disease)     Scoliosis      Past Surgical History:   Procedure Laterality Date    BIOPSY OF KIDNEY,OPEN EXPOS  2004    HX ORTHOPAEDIC  1994    Spinal fusion    HX OTHER SURGICAL      wisdom teeth extraction     Current Outpatient Medications   Medication Sig    dilTIAZem ER (CARDIZEM SR) 60 mg capsule Take 60 mg by mouth two (2) times a day.  glucose blood VI test strips (ASCENSIA AUTODISC VI, ONE TOUCH ULTRA TEST VI) strip Use to check blood sugars once daily. Dx. Code E11.65    lancets (One Touch Delica) 33 gauge misc Use to check blood sugars once daily. Dx. Code E11.65    liraglutide (Victoza 2-Franko) 0.6 mg/0.1 mL (18 mg/3 mL) pnij ADMINISTER 1.2 MG UNDER THE SKIN DAILY    Insulin Needles, Disposable, (Marian Pen Needle) 32 gauge x 5/32\" ndle USE AS DIRECTED EVERY DAY    glucose blood VI test strips (OneTouch Verio test strips) strip USE TO TEST BLOOD SUGAR ONCE DAILY. Dx code E11.65    glipiZIDE (GLUCOTROL) 5 mg tablet One pill, twenty min before b-dast and dinner    Blood-Glucose Meter (OneTouch Verio Flex meter) misc Use to check blood sugars once daily. Dx. Code E11.65    multivitamin (ONE DAILY) tablet Take 1 Tab by mouth daily.  DULoxetine (CYMBALTA) 60 mg capsule Take 60 mg by mouth daily.  metoprolol tartrate (LOPRESSOR) 100 mg IR tablet Take 150 mg by mouth two (2) times a day.  omeprazole (PRILOSEC) 20 mg capsule Take 40 mg by mouth daily.  atorvastatin (LIPITOR) 20 mg tablet Take 20 mg by mouth nightly.  acetaminophen (TYLENOL) 500 mg tablet Take 500 mg by mouth every six (6) hours as needed for Pain.     estradiol (ESTRACE) 1 mg tablet Take 1 mg by mouth daily. Take 2 tabs once daily    tiZANidine (ZANAFLEX) 2 mg capsule Take 2 mg by mouth every six (6) hours.  liraglutide (VICTOZA) 0.6 mg/0.1 mL (18 mg/3 mL) pnij sample    fluticasone (FLONASE) 50 mcg/actuation nasal spray 2 Sprays by Both Nostrils route once. No current facility-administered medications for this visit. Review of Systems   Constitutional: Negative. HENT: Negative. Eyes: Negative. Respiratory: Negative. Cardiovascular: Negative. Gastrointestinal: Negative. Genitourinary: Negative. Musculoskeletal: Negative. Skin: Negative. Neurological: Negative. Endo/Heme/Allergies: Negative. Psychiatric/Behavioral: Negative. Physical Exam   Constitutional: She is oriented to person, place, and time. She appears well-developed and well-nourished. HENT:   Head: Normocephalic. Eyes: Pupils are equal, round, and reactive to light. Conjunctivae and EOM are normal.   Neck: Normal range of motion. Neck supple. Cardiovascular: Normal rate and regular rhythm. Pulmonary/Chest: Effort normal and breath sounds normal.   Abdominal: Soft. Bowel sounds are normal.   Musculoskeletal: Normal range of motion. Neurological: She is alert and oriented to person, place, and time. She has normal reflexes. Skin: Skin is warm and dry. Psychiatric: She has a normal mood and affect. Lab Results   Component Value Date/Time    Hemoglobin A1c 6.4 (H) 11/21/2019 10:22 AM    Hemoglobin A1c 6.4 (H) 08/09/2019 11:20 AM    Hemoglobin A1c 9.2 (H) 03/14/2019 02:21 AM    Glucose 157 (H) 11/07/2020 07:55 AM    Glucose (POC) 335 (H) 09/16/2020 02:32 AM    Microalbumin/Creat ratio (mg/g creat)  11/21/2019 10:22 AM     Cannot calculate ratio due to microalbumin result outside reportable range.     Microalb/Creat ratio (ug/mg creat.) 5 11/07/2020 07:55 AM    Microalbumin,urine random <0.50 11/21/2019 10:22 AM    LDL, calculated 87.6 11/21/2019 10:22 AM    LDL Chol Calc (NIH) 111 (H) 11/07/2020 07:55 AM    Creatinine 1.08 (H) 11/07/2020 07:55 AM      Lab Results   Component Value Date/Time    Cholesterol, total 184 11/07/2020 07:55 AM    HDL Cholesterol 45 11/07/2020 07:55 AM    LDL, calculated 87.6 11/21/2019 10:22 AM    LDL Chol Calc (NIH) 111 (H) 11/07/2020 07:55 AM    LDL-C, External 96 02/11/2019    Triglyceride 157 (H) 11/07/2020 07:55 AM    CHOL/HDL Ratio 3.1 11/21/2019 10:22 AM     Lab Results   Component Value Date/Time    ALT (SGPT) 22 11/07/2020 07:55 AM    Alk. phosphatase 85 11/07/2020 07:55 AM    Bilirubin, total 0.4 11/07/2020 07:55 AM    Albumin 4.4 11/07/2020 07:55 AM    Protein, total 7.0 11/07/2020 07:55 AM    PLATELET 733 94/64/4265 09:00 PM     Lab Results   Component Value Date/Time    GFR est non-AA 63 11/07/2020 07:55 AM    GFR est AA 73 11/07/2020 07:55 AM    Creatinine 1.08 (H) 11/07/2020 07:55 AM    BUN 11 11/07/2020 07:55 AM    Sodium 141 11/07/2020 07:55 AM    Potassium 4.1 11/07/2020 07:55 AM    Chloride 99 11/07/2020 07:55 AM    CO2 26 11/07/2020 07:55 AM         ASSESSMENT and PLAN    1. Type 2 DM UN controlled :  a1c is   ? ?    Not available    Compared to     Over 11  %    From today  Sept 2020    Compared to   6.4 %   From nov 2019   Compared to   6.4 %     FROM AUGUST 2019     COMPARED TO  over 9 %  From march 2019 Nov 2020 :    Her bariatric surgeon Dr Philip Nelson wants [facility]   Not many checks     She is motivated and she has done better  She is counseled on why weight gain is happening with good glycemic control   Sugars seem good   She is exercising           Sept 2020      Lost control   She had corona virus  Around her b-day time and then whole family fell sick   She went into depression and is seeing psychologist   Only on Nejada Littleil thus far as she has done very well in managing diabetes    and today, I am starting  on glipizide  Asked her to call me if blood sugars are above 250mg Nov 2020      SHE Does WELL by a1c   No low sugars since stopping glipizide     Reviewed the glucose log :  mg   Could not  tolerate  Plain metfomrin   She stopped metformin  In may 2019 - via email conversation for GI distress   She stopped glipizide in July for low sugars     STAY on  JANUVIA   She is briefly not taking victoza because she ran out of it   Discussed pros and cons of victoza incl pancreatitis and [address]    Patient is advised to check blood sugars 1-2 times daily by rotation method. reviewed medications and side effects in detail  lab results and schedule of future lab studies reviewed with patient      2. Hypoglycemia :  Educated on treating the hypoglycemia. 3. HTN : well controlled, on metoprolol . Patient is educated about importance of compliance with anti-hypertensives especially ARB/ACEI    4. Dyslipidemia : continue lipitor . Patient is educated about benefits and adverse effects of statins and explained how benefits outweigh risk. 5. use of aspirin to prevent MI and TIA's discussed      6. Obesity : Body mass index is 38.86 kg/m². 7.  Neuropathy : diagnosed at age 19   She walks with help of cane   Reviewed information about neurology procedure patient had from Dr. Weiss Core notes dated May 16, 2018  Patient had chemodenervation of innervated facial trigeminal cervical spine   Patient had Botox injections for chronic intractable migraine        8. PATIENT HAS H/O MEMBRANOUS GLOMERULONEPHRITIS  10 YEARS AGO, BUT WAS NOT GIVEN ANY ETIOLOGY FOR IT   ( ONE EPISODE OF NEPHROTIC SYNDROME )        9.   On HRT - estrogen    She had XIN with [facility] - march 2019   cautioned her about the estrogen and blood clots          > 50 % of time is spent on counseling   Patient voiced understanding her plan of care

## 2020-11-25 NOTE — PROGRESS NOTES
1. Have you been to the ER, urgent care clinic since your last visit? No  Hospitalized since your last visit? No    2. Have you seen or consulted any other health care providers outside of the [address] [place] [address] since your last visit? Include any pap smears or colon screening. No    Wt Readings from Last 3 Encounters:   11/25/20 270 lb 12.8 oz (122.8 kg)   09/17/20 256 lb (116.1 kg)   09/15/20 258 lb (117 kg)     Temp Readings from Last 3 Encounters:   11/25/20 98.1 °F (36.7 °C) (Oral)   09/17/20 98.5 °F (36.9 °C) (Oral)   09/15/20 99.7 °F (37.6 °C)       Pulse Readings from Last 3 Encounters:   11/25/20 84   09/17/20 (!) 106   09/15/20 79   '  Lab Results   Component Value Date/Time    Hemoglobin A1c 6.4 (H) 11/21/2019 10:22 AM    Hemoglobin A1c (POC) 11.5 09/17/2020 11:57 AM     Patient has Onetouch Meter today.

## 2020-12-14 RX ORDER — GLIPIZIDE 5 MG/1
TABLET ORAL
Qty: 180 TAB | Refills: 2 | Status: SHIPPED | OUTPATIENT
Start: 2020-12-14 | End: 2021-03-22 | Stop reason: ALTCHOICE

## 2021-03-15 DIAGNOSIS — E11.65 UNCONTROLLED TYPE 2 DIABETES MELLITUS WITH HYPERGLYCEMIA (HCC): Primary | ICD-10-CM

## 2021-03-15 DIAGNOSIS — E78.2 MIXED HYPERLIPIDEMIA: ICD-10-CM

## 2021-03-15 DIAGNOSIS — I10 ESSENTIAL HYPERTENSION: ICD-10-CM

## 2021-03-16 LAB
ALBUMIN SERPL-MCNC: 3.9 G/DL (ref 3.8–4.8)
ALBUMIN/CREAT UR: 4 MG/G CREAT (ref 0–29)
ALBUMIN/GLOB SERPL: 1.4 {RATIO} (ref 1.2–2.2)
ALP SERPL-CCNC: 84 IU/L (ref 39–117)
ALT SERPL-CCNC: 22 IU/L (ref 0–32)
AST SERPL-CCNC: 22 IU/L (ref 0–40)
BILIRUB SERPL-MCNC: 0.4 MG/DL (ref 0–1.2)
BUN SERPL-MCNC: 8 MG/DL (ref 6–24)
BUN/CREAT SERPL: 9 (ref 9–23)
CALCIUM SERPL-MCNC: 9.8 MG/DL (ref 8.7–10.2)
CHLORIDE SERPL-SCNC: 105 MMOL/L (ref 96–106)
CHOLEST SERPL-MCNC: 114 MG/DL (ref 100–199)
CO2 SERPL-SCNC: 25 MMOL/L (ref 20–29)
CREAT SERPL-MCNC: 0.87 MG/DL (ref 0.57–1)
CREAT UR-MCNC: 444.9 MG/DL
EST. AVERAGE GLUCOSE BLD GHB EST-MCNC: 131 MG/DL
GLOBULIN SER CALC-MCNC: 2.7 G/DL (ref 1.5–4.5)
GLUCOSE SERPL-MCNC: 127 MG/DL (ref 65–99)
HBA1C MFR BLD: 6.2 % (ref 4.8–5.6)
HDLC SERPL-MCNC: 35 MG/DL
IMP & REVIEW OF LAB RESULTS: NORMAL
LDLC SERPL CALC-MCNC: 64 MG/DL (ref 0–99)
Lab: NORMAL
MICROALBUMIN UR-MCNC: 16.5 UG/ML
POTASSIUM SERPL-SCNC: 3.5 MMOL/L (ref 3.5–5.2)
PROT SERPL-MCNC: 6.6 G/DL (ref 6–8.5)
SODIUM SERPL-SCNC: 147 MMOL/L (ref 134–144)
TRIGL SERPL-MCNC: 75 MG/DL (ref 0–149)
VLDLC SERPL CALC-MCNC: 15 MG/DL (ref 5–40)

## 2021-03-22 ENCOUNTER — OFFICE VISIT (OUTPATIENT)
Dept: ENDOCRINOLOGY | Age: 41
End: 2021-03-22
Payer: MEDICAID

## 2021-03-22 VITALS
HEART RATE: 89 BPM | SYSTOLIC BLOOD PRESSURE: 113 MMHG | OXYGEN SATURATION: 97 % | RESPIRATION RATE: 18 BRPM | TEMPERATURE: 98.5 F | BODY MASS INDEX: 34.01 KG/M2 | DIASTOLIC BLOOD PRESSURE: 82 MMHG | WEIGHT: 237 LBS

## 2021-03-22 DIAGNOSIS — Z98.84 S/P BARIATRIC SURGERY: Primary | ICD-10-CM

## 2021-03-22 DIAGNOSIS — E11.65 UNCONTROLLED TYPE 2 DIABETES MELLITUS WITH HYPERGLYCEMIA (HCC): ICD-10-CM

## 2021-03-22 PROCEDURE — 99214 OFFICE O/P EST MOD 30 MIN: CPT | Performed by: INTERNAL MEDICINE

## 2021-03-22 RX ORDER — URSODIOL 300 MG/1
300 CAPSULE ORAL 2 TIMES DAILY
COMMUNITY
End: 2021-09-20

## 2021-03-22 RX ORDER — PANTOPRAZOLE SODIUM 40 MG/1
40 TABLET, DELAYED RELEASE ORAL DAILY
COMMUNITY
End: 2021-09-20

## 2021-03-22 NOTE — LETTER
3/22/2021 Patient: Fracisco Lomax YOB: 1980 Date of Visit: 3/22/2021 Gayle Campbell NP 
[address] [facility] [address] Via Fax: [phone] Dear Gayle Campbell NP, Thank you for referring Ms. Brett Machuca to [address] [place] [address] for evaluation. My notes for this consultation are attached. If you have questions, please do not hesitate to call me. I look forward to following your patient along with you. Sincerely, Kalyani Steiner MD

## 2021-03-22 NOTE — PROGRESS NOTES
HISTORY OF PRESENT ILLNESS  Marcia Estes is a 41 y.o. female. HPI   Patient here for FOLLOW UP AFTER   Last  visit of Type 2 diabetes mellitus, s/p gastric bypass   From November 2020     WEIGHT LOSS OF 33 LBS since she had gastric bypass done at [facility] in jan 2021 - By Dr. Telma Gilmore diabetic and BP  meds            Nov 2020      She has done well   She has gained  Weight   Sugars are good       Sept 2020     She called c/o very high sugars and I realized that she has not seen me for over a year   She has gotten very non compliant with diet because of pandemic         Nov 2019  :     Pt reports having s/s of stroke/TIA in nov 2019   She reports having headache, dropping face on right side , and was found to have elevated  /120 and blood sugar was 107 mg   She went to ER   Was told it was migraine   She has an appt with neurologist  Soon           Old history :     WEIGHT IS STABLE   SHE HAS DONE VERY WELL   SUGARS ARE GREAT ON LOG   SHE HAS DONE BY PLANNING MEALS  FOR NEXT DAY   SHE HAS STOPPED FAST FOODS   She stopped metformin  In may 2019 - via email conversation   She stopped glipizide in July for low sugars             OLD HISTORY  :   Referred : by self/pcp    H/o diabetes for 3 years     She wants to get care done as she saw her mother doing well with diabetes. Current A1C is over 9 %   From march 2019  and symptoms/problems include high sugars    Current diabetic medications include glipizide  Er 5 mg once  A day . Current monitoring regimen: home blood tests - daily  Home blood sugar records: trend: fluctuating a lot    Review of Systems   Constitutional: Negative. Psychiatric/Behavioral: Negative. Physical Exam   Constitutional: She is oriented to person, place, and time. She appears well-developed and well-nourished. Psychiatric: She has a normal mood and affect.        Lab Results   Component Value Date/Time    Hemoglobin A1c 6.2 (H) 03/15/2021 12:00 AM Hemoglobin A1c 6.4 (H) 11/21/2019 10:22 AM    Hemoglobin A1c 6.4 (H) 08/09/2019 11:20 AM    Glucose 127 (H) 03/15/2021 12:00 AM    Glucose (POC) 335 (H) 09/16/2020 02:32 AM    Microalbumin/Creat ratio (mg/g creat)  11/21/2019 10:22 AM     Cannot calculate ratio due to microalbumin result outside reportable range. Microalb/Creat ratio (ug/mg creat.) 4 03/15/2021 12:00 AM    Microalbumin,urine random <0.50 11/21/2019 10:22 AM    LDL, calculated 64 03/15/2021 12:00 AM    LDL, calculated 87.6 11/21/2019 10:22 AM    Creatinine 0.87 03/15/2021 12:00 AM      Lab Results   Component Value Date/Time    Cholesterol, total 114 03/15/2021 12:00 AM    HDL Cholesterol 35 (L) 03/15/2021 12:00 AM    LDL, calculated 64 03/15/2021 12:00 AM    LDL, calculated 87.6 11/21/2019 10:22 AM    LDL-C, External 96 02/11/2019    Triglyceride 75 03/15/2021 12:00 AM    CHOL/HDL Ratio 3.1 11/21/2019 10:22 AM     Lab Results   Component Value Date/Time    ALT (SGPT) 22 03/15/2021 12:00 AM    Alk. phosphatase 84 03/15/2021 12:00 AM    Bilirubin, total 0.4 03/15/2021 12:00 AM    Albumin 3.9 03/15/2021 12:00 AM    Protein, total 6.6 03/15/2021 12:00 AM    PLATELET 594 81/70/5234 09:00 PM     Lab Results   Component Value Date/Time    GFR est non-AA 82 03/15/2021 12:00 AM    GFR est AA 95 03/15/2021 12:00 AM    Creatinine 0.87 03/15/2021 12:00 AM    BUN 8 03/15/2021 12:00 AM    Sodium 147 (H) 03/15/2021 12:00 AM    Potassium 3.5 03/15/2021 12:00 AM    Chloride 105 03/15/2021 12:00 AM    CO2 25 03/15/2021 12:00 AM         ASSESSMENT and PLAN    1. Type 2 DM UN controlled :  a1c is    6.2 %     From march 2021   Compared to   ? ?    Not available   From nov 2020     Compared to     Over 11  %    From today  Sept 2020    Compared to   6.4 %   From nov 2019   Compared to   6.4 %     FROM AUGUST 2019     COMPARED TO  over 9 %  From march 2019 March 2021     She has done well   S/o Gastric bypass - jan 2021   Currently off meds - glipizide and victoza         Nov 2020 :    Her bariatric surgeon Dr Whit Bryant [facility]   Not many checks   She is motivated and she has done better  She is counseled on why weight gain is happening with good glycemic control   Sugars seem good   She is exercising           Sept 2020      Lost control   She had corona virus  Around her b-day time and then whole family fell sick   She went into depression and is seeing psychologist   Only on Sb Terrell thus far as she has done very well in managing diabetes    and today, I am starting  on glipizide  Asked her to call me if blood sugars are above 250mg           Nov 2020      SHE Does WELL by a1c   No low sugars since stopping glipizide     Reviewed the glucose log :  mg   Could not  tolerate  Plain metfomrin   She stopped metformin  In may 2019 - via email conversation for GI distress   She stopped glipizide in July for low sugars   STAY on  JANUVIA       2. Hypoglycemia :  Educated on treating the hypoglycemia. 3. HTN : well controlled, not on any meds     4. Dyslipidemia : continue lipitor     5. . Obesity : Body mass index is 34.01 kg/m². S/p gastric bypass   Bariatric cocktail       7. Neuropathy : diagnosed at age 19   She walks with help of cane   Reviewed information about neurology procedure patient had from Dr. Jaswant Kellogg notes dated May 16, 2018  Patient had chemodenervation of innervated facial trigeminal cervical spine   Patient had Botox injections for chronic intractable migraine        8. PATIENT HAS H/O MEMBRANOUS GLOMERULONEPHRITIS  10 YEARS AGO, BUT WAS NOT GIVEN ANY ETIOLOGY FOR IT ( ONE EPISODE OF NEPHROTIC SYNDROME )        9.   On HRT - estrogen    She had XIN with [facility] - march 2019   cautioned her about the estrogen and blood clots          Reviewed results with patient and discussed the labs being ordered today/bnv  Patient voiced understanding of plan of care

## 2021-03-22 NOTE — PROGRESS NOTES
1. Have you been to the ER, urgent care clinic since your last visit? no Hospitalized since your last visit? Yes When: January, 20, 2021-[facility]    2. Have you seen or consulted any other health care providers outside of the [address] [place] [address] since your last visit? Include any pap smears or colon screening.  No    Wt Readings from Last 3 Encounters:   03/22/21 237 lb (107.5 kg)   11/25/20 270 lb 12.8 oz (122.8 kg)   09/17/20 256 lb (116.1 kg)     Temp Readings from Last 3 Encounters:   03/22/21 98.5 °F (36.9 °C)   11/25/20 98.1 °F (36.7 °C) (Oral)   09/17/20 98.5 °F (36.9 °C) (Oral)     BP Readings from Last 3 Encounters:   03/22/21 113/82   11/25/20 105/69   09/17/20 110/84     Pulse Readings from Last 3 Encounters:   03/22/21 89   11/25/20 84   09/17/20 (!) 106     Lab Results   Component Value Date/Time    Hemoglobin A1c 6.2 (H) 03/15/2021 12:00 AM    Hemoglobin A1c (POC) 11.5 09/17/2020 11:57 AM

## 2021-03-22 NOTE — PATIENT INSTRUCTIONS
SPECIFIC INSTRUCTIONS BELOW     Not on any diabetic meds         PAY ATTENTION TO THESE GENERAL INSTRUCTIONS     - HEALTH MAINTENANCE IS NOT GOING TO BE UP TO DATE ON YOUR AVS- PLEASE IGNORE   - YOUR MED LIST IS NOT UP TO DATE AS SOME CHANGES ARE BEING MADE AFTER THE VISIT - FOLLOW SPECIFIC INSTRUCTIONS ABOVE     Results     *Normal results will not be notified by a phone call starting January 1 2021   *If you have an upcoming visit, the results will be discussed at the visit   *Please sign up for MY CHART if you want access to your lab and test results  *Abnormal results which require immediate attention will be notified by phone call   *Abnormal results which do not require immediate assistance will be notified in 1-2 weeks       Refills    -    have your pharmacy send us a refill request  Phone calls  -  Allow  24 hrs.  for non-urgent calls to be returned  Prior authorization - It may take 2-4 weeks to process  Forms  -  FMLA, DMV etc., will take up to 2 weeks to process  Cancellations - please notify the office 2 days in advance   Samples  - will only be dispensed at visits     --------------------------------------------------------------------------------------------

## 2021-03-26 ENCOUNTER — TRANSCRIBE ORDER (OUTPATIENT)
Dept: SCHEDULING | Age: 41
End: 2021-03-26

## 2021-03-26 DIAGNOSIS — Z12.31 VISIT FOR SCREENING MAMMOGRAM: Primary | ICD-10-CM

## 2021-03-26 DIAGNOSIS — Z80.3 FAMILY HISTORY OF MALIGNANT NEOPLASM OF BREAST: ICD-10-CM

## 2021-05-24 ENCOUNTER — HOSPITAL ENCOUNTER (OUTPATIENT)
Dept: MRI IMAGING | Age: 41
Discharge: HOME | End: 2021-05-24
Attending: STUDENT IN AN ORGANIZED HEALTH CARE EDUCATION/TRAINING PROGRAM

## 2021-05-24 VITALS — BODY MASS INDEX: 30.42 KG/M2 | WEIGHT: 212 LBS

## 2021-05-24 DIAGNOSIS — Z80.3 FAMILY HISTORY OF MALIGNANT NEOPLASM OF BREAST: ICD-10-CM

## 2021-05-24 DIAGNOSIS — Z12.31 VISIT FOR SCREENING MAMMOGRAM: ICD-10-CM

## 2021-06-21 ENCOUNTER — HOSPITAL ENCOUNTER (OUTPATIENT)
Dept: MRI IMAGING | Age: 41
Discharge: HOME | End: 2021-06-21
Attending: STUDENT IN AN ORGANIZED HEALTH CARE EDUCATION/TRAINING PROGRAM
Payer: MEDICAID

## 2021-06-21 PROCEDURE — 74011000258 HC RX REV CODE- 258: Performed by: STUDENT IN AN ORGANIZED HEALTH CARE EDUCATION/TRAINING PROGRAM

## 2021-06-21 PROCEDURE — 77049 MRI BREAST C-+ W/CAD BI: CPT

## 2021-06-21 PROCEDURE — A9585 GADOBUTROL INJECTION: HCPCS

## 2021-06-21 PROCEDURE — 74011250636 HC RX REV CODE- 250/636

## 2021-06-21 RX ORDER — SODIUM CHLORIDE 0.9 % (FLUSH) 0.9 %
10 SYRINGE (ML) INJECTION
Status: COMPLETED | OUTPATIENT
Start: 2021-06-21 | End: 2021-06-21

## 2021-06-21 RX ADMIN — Medication 10 ML: at 13:50

## 2021-06-21 RX ADMIN — SODIUM CHLORIDE 100 ML: 900 INJECTION, SOLUTION INTRAVENOUS at 13:51

## 2021-06-21 RX ADMIN — GADOBUTROL 9 ML: 604.72 INJECTION INTRAVENOUS at 13:49

## 2021-09-11 LAB
25(OH)D3+25(OH)D2 SERPL-MCNC: 33.2 NG/ML (ref 30–100)
ALBUMIN SERPL-MCNC: 4 G/DL (ref 3.8–4.8)
ALBUMIN/CREAT UR: 3 MG/G CREAT (ref 0–29)
ALBUMIN/GLOB SERPL: 1.3 {RATIO} (ref 1.2–2.2)
ALP SERPL-CCNC: 96 IU/L (ref 48–121)
ALT SERPL-CCNC: 16 IU/L (ref 0–32)
AST SERPL-CCNC: 15 IU/L (ref 0–40)
BILIRUB SERPL-MCNC: 0.3 MG/DL (ref 0–1.2)
BUN SERPL-MCNC: 9 MG/DL (ref 6–24)
BUN/CREAT SERPL: 10 (ref 9–23)
CALCIUM SERPL-MCNC: 9.5 MG/DL (ref 8.7–10.2)
CHLORIDE SERPL-SCNC: 101 MMOL/L (ref 96–106)
CHOLEST SERPL-MCNC: 213 MG/DL (ref 100–199)
CO2 SERPL-SCNC: 29 MMOL/L (ref 20–29)
CREAT SERPL-MCNC: 0.9 MG/DL (ref 0.57–1)
CREAT UR-MCNC: 307.9 MG/DL
EST. AVERAGE GLUCOSE BLD GHB EST-MCNC: 111 MG/DL
GLOBULIN SER CALC-MCNC: 3 G/DL (ref 1.5–4.5)
GLUCOSE SERPL-MCNC: 91 MG/DL (ref 65–99)
HBA1C MFR BLD: 5.5 % (ref 4.8–5.6)
HDLC SERPL-MCNC: 58 MG/DL
IMP & REVIEW OF LAB RESULTS: NORMAL
LDLC SERPL CALC-MCNC: 135 MG/DL (ref 0–99)
MICROALBUMIN UR-MCNC: 8.3 UG/ML
POTASSIUM SERPL-SCNC: 3.8 MMOL/L (ref 3.5–5.2)
PROT SERPL-MCNC: 7 G/DL (ref 6–8.5)
SODIUM SERPL-SCNC: 139 MMOL/L (ref 134–144)
TRIGL SERPL-MCNC: 113 MG/DL (ref 0–149)
TSH SERPL DL<=0.005 MIU/L-ACNC: 1.91 UIU/ML (ref 0.45–4.5)
VIT B12 SERPL-MCNC: 900 PG/ML (ref 232–1245)
VLDLC SERPL CALC-MCNC: 20 MG/DL (ref 5–40)

## 2021-09-20 ENCOUNTER — OFFICE VISIT (OUTPATIENT)
Dept: ENDOCRINOLOGY | Age: 41
End: 2021-09-20
Payer: MEDICAID

## 2021-09-20 VITALS
BODY MASS INDEX: 29.98 KG/M2 | RESPIRATION RATE: 18 BRPM | SYSTOLIC BLOOD PRESSURE: 104 MMHG | HEIGHT: 70 IN | TEMPERATURE: 97.6 F | HEART RATE: 64 BPM | DIASTOLIC BLOOD PRESSURE: 70 MMHG | WEIGHT: 209.4 LBS | OXYGEN SATURATION: 97 %

## 2021-09-20 DIAGNOSIS — E78.2 MIXED HYPERLIPIDEMIA: ICD-10-CM

## 2021-09-20 DIAGNOSIS — E11.65 UNCONTROLLED TYPE 2 DIABETES MELLITUS WITH HYPERGLYCEMIA (HCC): ICD-10-CM

## 2021-09-20 DIAGNOSIS — Z98.84 S/P BARIATRIC SURGERY: Primary | ICD-10-CM

## 2021-09-20 PROCEDURE — 99214 OFFICE O/P EST MOD 30 MIN: CPT | Performed by: INTERNAL MEDICINE

## 2021-09-20 RX ORDER — DUPILUMAB 300 MG/2ML
300 INJECTION, SOLUTION SUBCUTANEOUS
COMMUNITY

## 2021-09-20 RX ORDER — ATORVASTATIN CALCIUM 20 MG/1
TABLET, FILM COATED ORAL
Qty: 90 TABLET | Refills: 3 | Status: SHIPPED | OUTPATIENT
Start: 2021-09-20

## 2021-09-20 NOTE — PATIENT INSTRUCTIONS
SPECIFIC INSTRUCTIONS BELOW     Resume lipitor  Or atorvostatin at night       -------------PAY ATTENTION TO THESE GENERAL INSTRUCTIONS -----------------    -ANY tests other than blood work, which you opt to do  outside the  [facility] imaging facilities, you are responsible for prior authorizations if  required    - [address] UP TO DATE ON YOUR AVS- PLEASE IGNORE   - YOUR MED LIST IS NOT UP TO DATE AS SOME CHANGES ARE BEING MADE AFTER THE VISIT - [address]     Results     *Normal results will not be notified by a phone call starting January 1 2021   *If you have an upcoming visit, the results will be discussed at the visit   *Please sign up for MY CHART if you want access to your lab and test results  *Abnormal results which require immediate attention will be notified by phone call   *Abnormal results which do not require immediate assistance will be notified in 1-2 weeks       Refills    -    have your pharmacy send us a refill request . Refills are done max for one year and a visit is a must before refills are extended    Follow up appointments -  highly encourage you to make it when you are checking out. We can accommodate you into the schedule based on your clinical situation, but not for extending refills beyond a year. Labs are important to give refills and is important to get labs before the visit     Phone calls  -  Allow  24 hrs.  for non-urgent calls to be returned  Prior authorization - It may take 2-4 weeks to process  Forms  -  FMLA, DMV etc., will take up to 2 weeks to process  Cancellations - please notify the office 2 days in advance   Samples  - will only be dispensed at visits       If not showing for the appointments and cancelling appointments within 24 hours are kept track of and three  of such situations in  two consecutive years will likely be considered for termination from the practice    -------------------------------------------------------------------------------------------------------------------

## 2021-09-20 NOTE — PROGRESS NOTES
HISTORY OF PRESENT ILLNESS  Jasper Chappell is a 35 y.o. female. HPI   Patient here for FOLLOW UP AFTER   Last  visit of Type 2 diabetes mellitus, s/p gastric bypass   From March 2021     Since last seen  She got off all the meds after gastric bypass and has been checkign sugars here and there   She had done well      LOST 30 lbs        March 2021     WEIGHT LOSS OF 33 LBS since she had gastric bypass done at [facility] in jan 2021 - By Dr. Ayden Salazar diabetic and BP  meds            Nov 2020      She has done well   She has gained  Weight   Sugars are good       OLD HISTORY  :   Referred : by self/pcp  H/o diabetes for 3 years   She wants to get care done as she saw her mother doing well with diabetes. Current A1C is over 9 %   From march 2019  and symptoms/problems include high sugars    Current diabetic medications include glipizide  Er 5 mg once  A day . Current monitoring regimen: home blood tests - daily  Home blood sugar records: trend: fluctuating a lot    Review of Systems   Constitutional: Negative. Psychiatric/Behavioral: Negative. Physical Exam   Constitutional: She is oriented to person, place, and time. She appears well-developed and well-nourished. Psychiatric: She has a normal mood and affect. Lab Results   Component Value Date/Time    Hemoglobin A1c 5.5 09/10/2021 09:22 AM    Hemoglobin A1c 6.2 (H) 03/15/2021 12:00 AM    Hemoglobin A1c 6.4 (H) 11/21/2019 10:22 AM    Glucose 91 09/10/2021 09:22 AM    Glucose (POC) 335 (H) 09/16/2020 02:32 AM    Microalbumin/Creat ratio (mg/g creat)  11/21/2019 10:22 AM     Cannot calculate ratio due to microalbumin result outside reportable range.     Microalb/Creat ratio (ug/mg creat.) 3 09/10/2021 09:22 AM    Microalbumin,urine random <0.50 11/21/2019 10:22 AM    LDL, calculated 135 (H) 09/10/2021 09:22 AM    LDL, calculated 87.6 11/21/2019 10:22 AM    Creatinine 0.90 09/10/2021 09:22 AM      Lab Results   Component Value Date/Time Cholesterol, total 213 (H) 09/10/2021 09:22 AM    HDL Cholesterol 58 09/10/2021 09:22 AM    LDL, calculated 135 (H) 09/10/2021 09:22 AM    LDL, calculated 87.6 11/21/2019 10:22 AM    LDL-C, External 96 02/11/2019 12:00 AM    Triglyceride 113 09/10/2021 09:22 AM    CHOL/HDL Ratio 3.1 11/21/2019 10:22 AM     Lab Results   Component Value Date/Time    ALT (SGPT) 16 09/10/2021 09:22 AM    Alk. phosphatase 96 09/10/2021 09:22 AM    Bilirubin, total 0.3 09/10/2021 09:22 AM    Albumin 4.0 09/10/2021 09:22 AM    Protein, total 7.0 09/10/2021 09:22 AM    PLATELET 910 12/57/1569 09:00 PM     Lab Results   Component Value Date/Time    GFR est non-AA 79 09/10/2021 09:22 AM    GFR est AA 91 09/10/2021 09:22 AM    Creatinine 0.90 09/10/2021 09:22 AM    BUN 9 09/10/2021 09:22 AM    Sodium 139 09/10/2021 09:22 AM    Potassium 3.8 09/10/2021 09:22 AM    Chloride 101 09/10/2021 09:22 AM    CO2 29 09/10/2021 09:22 AM         ASSESSMENT and PLAN    1. Type 2 DM UN controlled :  a1c is   5.5 %    From   Sept 2021  Compared to     6.2 %     From march 2021   Compared to   ? ?    Not available   From nov 2020     Compared to     Over 11  %    From today  Sept 2020    Compared to   6.4 %   From nov 2019   Compared to   6.4 %     FROM AUGUST 2019     COMPARED TO  over 9 %  From march 2019 Sept 2021     She has done so well, s/p gastric bypass with weight loss and no medications         March 2021     She has done well   S/o Gastric bypass - jan 2021  @ [facility]   Currently off meds - glipizide and victoza         Nov 2020 :    [facility]   Not many checks   She is motivated and she has done better  She is counseled on why weight gain is happening with good glycemic control   Sugars seem good   She is exercising         Sept 2020    Lost control   She had corona virus  Around her b-day time and then whole family fell sick   She went into depression and is seeing psychologist   [facility] as she has done very well in managing diabetes    and today, I am starting  on glipizide  Asked her to call me if blood sugars are above 250mg           2. Hypoglycemia :  Educated on treating the hypoglycemia. 3. HTN : well controlled, not on any meds     4. Dyslipidemia : the LDL was high to 135  -  Resume  statin    5. . Obesity : Body mass index is 30.05 kg/m². S/p gastric bypass   Bariatric cocktail       7. Neuropathy : diagnosed at age 19   Reviewed information about neurology procedure patient had from Dr. Glasgow Staff notes dated May 16, 2018  Patient had chemodenervation of innervated facial trigeminal cervical spine   Patient had Botox injections for chronic intractable migraine        8. PATIENT HAS H/O MEMBRANOUS GLOMERULONEPHRITIS  10 YEARS AGO, BUT WAS NOT GIVEN ANY ETIOLOGY FOR IT ( ONE EPISODE OF NEPHROTIC SYNDROME )        9.   On HRT - estrogen    She had XIN with BLSO - march 2019   cautioned her about the estrogen and blood clots          Reviewed results with patient and discussed the labs being ordered today/bnv  Patient voiced understanding of plan of care

## 2021-09-20 NOTE — LETTER
9/20/2021    Patient: Joshua Swift   YOB: 1980   Date of Visit: 9/20/2021     Jamilah Mahmood, [address] [address]  Via Fax: [phone]    Dear Jamilah Mahmood DO,      Thank you for referring Ms. Wyatt Fields to [address] [place] [address] for evaluation. My notes for this consultation are attached. If you have questions, please do not hesitate to call me. I look forward to following your patient along with you.       Sincerely,    Bhargavi Herrmann MD

## 2022-03-18 PROBLEM — R10.2 PELVIC PAIN: Status: ACTIVE | Noted: 2019-03-13

## 2022-03-19 PROBLEM — N93.9 ABNORMAL UTERINE BLEEDING: Status: ACTIVE | Noted: 2019-03-13

## 2022-10-11 NOTE — LETTER
Subjective     10/11 - Up to chair, daughter at bedside.  Reviewed PERRY results with them and discussed HF.     Scheduled:   Current Facility-Administered Medications   Medication Dose Route Frequency Provider Last Rate Last Admin   • vancomycin (VANCOCIN) 500 mg in sodium chloride 0.9 % 100 mL IVPB  500 mg Intravenous 2 times per day Va Smith  mL/hr at 10/11/22 0928 Rate Verify at 10/11/22 0928   • sodium chloride (PF) 0.9 % injection 10 mL  10 mL Injection 2 times per day Brent Mccall MD   10 mL at 10/11/22 0900   • furosemide (LASIX INJECT) injection 20 mg  20 mg Intravenous BID Tod Villanueva DO   20 mg at 10/11/22 0858   • cefepime (MAXIPIME) 2,000 mg in sodium chloride 0.9 % 100 mL IVPB  2,000 mg Intravenous 2 times per day Va Smith MD 25 mL/hr at 10/11/22 0928 Rate Verify at 10/11/22 0928   • aspirin (ECOTRIN) enteric coated tablet 81 mg  81 mg Oral Daily Va Smith MD   81 mg at 10/11/22 0858   • cholecalciferol (VITAMIN D) tablet 25 mcg  25 mcg Oral Daily Va Smith MD   25 mcg at 10/11/22 0858   • enalapril (VASOTEC) tablet 2.5 mg  2.5 mg Oral Daily Va Smith MD   2.5 mg at 10/11/22 0858   • VANCOMYCIN - PHARMACIST MONITORED Misc   Does not apply See Admin Instructions Va Smith MD            I/O's    Intake/Output Summary (Last 24 hours) at 10/11/2022 1258  Last data filed at 10/11/2022 1015  Gross per 24 hour   Intake 1673.44 ml   Output 800 ml   Net 873.44 ml       Last Recorded Vitals  Vitals:    10/10/22 2206 10/11/22 0500 10/11/22 0712 10/11/22 1015   BP: 108/57  (!) 153/67 116/64   BP Location: LUE - Left upper extremity      Patient Position: Supine      Pulse: 68  62 70   Resp: 16      Temp: 98.2 °F (36.8 °C)  97.5 °F (36.4 °C) 97.3 °F (36.3 °C)   TempSrc: Oral  Oral Oral   SpO2: 96%  98% 97%   Weight:  58.1 kg (128 lb 1.4 oz)     Height:          Body mass index is 24.2 kg/m².    Physical Exam  Neurological:      Cranial  9/20/20 Patient: [place] YOB: 1980 Date of Visit: 9/17/2020 Jose Maxwell NP 
[address] [place] [address] VIA Facsimile: [phone] Dear Jose Maxwell NP, Thank you for referring Ms. Jovita De Luna to [address] [address] for evaluation. My notes for this consultation are attached. If you have questions, please do not hesitate to call me. I look forward to following your patient along with you. Sincerely, Carmien Hall MD 
 
 Nerves: Cranial nerve deficit present.        Generally she appears ill, no acute distress  Decreased breath sounds  Cardiac exam reveals a regular rate with a soft systolic murmur  Abdomen is soft  She does have mild edema in her hands  She has chronic mild lymphedema in her legs more on the right than the left  Neurologic exam is nonfocal.      Labs     Recent Labs     10/09/22  0845 10/10/22  0445   SODIUM 140 138   POTASSIUM 3.6 3.5   CHLORIDE 104 102   CO2 29 31   ANIONGAP 11 9   GLUCOSE 143* 104*   BUN 23* 25*   CREATININE 0.69 0.58  0.58   BCRAT 33* 43*   CALCIUM 8.6 8.7       Lab Results   Component Value Date    HTROPI 224 () 10/09/2022    HTROPI 366 () 10/08/2022    HTROPI 996 () 10/07/2022         Recent Labs     10/10/22  0445   WBC 7.3   HGB 10.1*   HCT 32.1*        Cardiac catheterization 2019, mild coronary artery disease, severe aortic stenosis     Echocardiogram May 2022 showed normal systolic function and normal functioning aortic valve replacement/TAVR valve     Chest x-ray shows some mild congestion and pleural effusions     EKG sinus rhythm with left ventricular hypertrophy and repolarization abnormalities     , troponin 2800, BNP 10,000    [facility]*  *Cardiodiagnostics*  [address] [place], [place] [id number] [phone]  Transthoracic Echocardiogram (TTE)     Patient: Rani Sheffield      Study Date/Time:     Oct 7 2022 1:52PM  MRN:     [id number]                    FIN#:                [id number]  :     1940                 Ht/Wt:               154.9cm 57.6kg  Age:     82                         BSA/BMI:             1.58m^2 24kg/m^2  Gender:  F                          Baseline BP:         120 / 60  Ordering Physician:     Tod Villanueva DO     Attending Physician:    Va Smith  Performing Physician:   Tod Villanueva DO  Diagnostic Physician:   Tod Villanueva DO  Sonographer:            Dianne [facility]      ------------------------------------------------------------------------------  INDICATIONS:   Aortic stenosis.  Elevated Troponin.     ------------------------------------------------------------------------------  STUDY CONCLUSIONS  SUMMARY:     1. Left ventricle: The cavity size is normal. Wall thickness is mildly     increased. Systolic function is normal. The ejection fraction was measured     by visual estimation. DEFINITY used. The ejection fraction is 60%.  2. Aortic valve: A prosthetic device is present and functioning normally.     Prior repair procedures include TAVR. There is very mild stenosis. Peak 2.1     m/sec, mean < 10 mmHg,  3. Left atrium: The atrium is mildly dilated.  4. Right ventricle: The cavity size is normal. Wall thickness is normal.     Systolic function is normal.  5. Difficult study due to rib deformity.    ASSESSMENT  82-year-old female with fever and weakness and shortness of breath and non-ST elevation myocardial infarction with likely some component of some diastolic heart failure     Possible pneumonia     Acute on chronic left ventricular diastolic congestive heart failure.  Her BNP is about 10,000     Non-ST elevation myocardial infarction secondary to subendocardial ischemia, demand ischemia from infection and may be some heart failure.  Her ejection fraction is normal.  She had mild coronary artery disease in the past she is not having any angina     History of transcatheter aortic valve replacement January 2019 for severe aortic stenosis     History of hypertension    PLAN    Cont meds and lasix - less edema  Cont ATBX  ECHO EF 60%, (hx TAVR) , normal functioning prosthetic AV with mild stenosis  Breathing better > continues to deny CP     ID requesting PERRY to r/o endocarditis  PERRY 10/10/22 --> TAVR valve with NORMAL function - no vegetation, Mild MR, no thrombus (Versed 2 mg + Fent 25 mcg)    Compensated from HF perspective.  Stable for Rehab.  Follow up in 2 weeks.      JUSTEN Parker / Tod Villanueva, DO

## 2022-10-20 RX ORDER — BUPROPION HYDROCHLORIDE 300 MG/1
300 TABLET ORAL DAILY
COMMUNITY

## 2022-10-21 ENCOUNTER — HOSPITAL ENCOUNTER (OUTPATIENT)
Dept: PREADMISSION TESTING | Age: 42
Discharge: HOME | End: 2022-10-21
Payer: MEDICAID

## 2022-10-21 VITALS
OXYGEN SATURATION: 100 % | DIASTOLIC BLOOD PRESSURE: 89 MMHG | SYSTOLIC BLOOD PRESSURE: 125 MMHG | WEIGHT: 203.26 LBS | TEMPERATURE: 98.6 F | HEART RATE: 81 BPM | BODY MASS INDEX: 30.11 KG/M2 | HEIGHT: 69 IN | RESPIRATION RATE: 16 BRPM

## 2022-10-21 LAB
ALBUMIN SERPL-MCNC: 3.5 G/DL (ref 3.5–5)
ALBUMIN/GLOB SERPL: 1 {RATIO} (ref 1.1–2.2)
ALP SERPL-CCNC: 73 U/L (ref 45–117)
ALT SERPL-CCNC: 32 U/L (ref 12–78)
ANION GAP SERPL CALC-SCNC: 3 MMOL/L (ref 5–15)
APTT PPP: 29.5 SEC (ref 21.2–34.1)
AST SERPL W P-5'-P-CCNC: 21 U/L (ref 15–37)
BILIRUB SERPL-MCNC: 0.2 MG/DL (ref 0.2–1)
BUN SERPL-MCNC: 13 MG/DL (ref 6–20)
BUN/CREAT SERPL: 13 (ref 12–20)
CA-I BLD-MCNC: 8.9 MG/DL (ref 8.5–10.1)
CHLORIDE SERPL-SCNC: 107 MMOL/L (ref 97–108)
CO2 SERPL-SCNC: 32 MMOL/L (ref 21–32)
CREAT SERPL-MCNC: 1.02 MG/DL (ref 0.55–1.02)
ERYTHROCYTE [DISTWIDTH] IN BLOOD BY AUTOMATED COUNT: 14.2 % (ref 11.5–14.5)
GLOBULIN SER CALC-MCNC: 3.5 G/DL (ref 2–4)
GLUCOSE SERPL-MCNC: 84 MG/DL (ref 65–100)
HCT VFR BLD AUTO: 39.1 % (ref 35–47)
HGB BLD-MCNC: 12 G/DL (ref 11.5–16)
INR PPP: 1 (ref 0.9–1.1)
MCH RBC QN AUTO: 27.3 PG (ref 26–34)
MCHC RBC AUTO-ENTMCNC: 30.7 G/DL (ref 30–36.5)
MCV RBC AUTO: 88.9 FL (ref 80–99)
NRBC # BLD: 0 K/UL (ref 0–0.01)
NRBC BLD-RTO: 0 PER 100 WBC
PLATELET # BLD AUTO: 216 K/UL (ref 150–400)
PMV BLD AUTO: 10 FL (ref 8.9–12.9)
POTASSIUM SERPL-SCNC: 4.1 MMOL/L (ref 3.5–5.1)
PROT SERPL-MCNC: 7 G/DL (ref 6.4–8.2)
PROTHROMBIN TIME: 13.4 SEC (ref 11.9–14.6)
RBC # BLD AUTO: 4.4 M/UL (ref 3.8–5.2)
SODIUM SERPL-SCNC: 142 MMOL/L (ref 136–145)
THERAPEUTIC RANGE,PTTT: NORMAL SEC (ref 82–109)
WBC # BLD AUTO: 4.9 K/UL (ref 3.6–11)

## 2022-10-21 PROCEDURE — 93005 ELECTROCARDIOGRAM TRACING: CPT

## 2022-10-21 PROCEDURE — 80053 COMPREHEN METABOLIC PANEL: CPT

## 2022-10-21 PROCEDURE — 85610 PROTHROMBIN TIME: CPT

## 2022-10-21 PROCEDURE — 36415 COLL VENOUS BLD VENIPUNCTURE: CPT

## 2022-10-21 PROCEDURE — 85027 COMPLETE CBC AUTOMATED: CPT

## 2022-10-21 PROCEDURE — 85730 THROMBOPLASTIN TIME PARTIAL: CPT

## 2022-10-24 ENCOUNTER — HOSPITAL ENCOUNTER (OUTPATIENT)
Age: 42
Discharge: HOME | End: 2022-10-24
Attending: INTERNAL MEDICINE | Admitting: INTERNAL MEDICINE
Payer: MEDICAID

## 2022-10-24 ENCOUNTER — ANESTHESIA (OUTPATIENT)
Dept: NON INVASIVE DIAGNOSTICS | Age: 42
End: 2022-10-24
Payer: MEDICAID

## 2022-10-24 ENCOUNTER — ANESTHESIA EVENT (OUTPATIENT)
Dept: NON INVASIVE DIAGNOSTICS | Age: 42
End: 2022-10-24
Payer: MEDICAID

## 2022-10-24 VITALS
OXYGEN SATURATION: 98 % | RESPIRATION RATE: 18 BRPM | SYSTOLIC BLOOD PRESSURE: 125 MMHG | HEART RATE: 72 BPM | BODY MASS INDEX: 28.63 KG/M2 | DIASTOLIC BLOOD PRESSURE: 77 MMHG | TEMPERATURE: 97.8 F | WEIGHT: 200 LBS | HEIGHT: 70 IN

## 2022-10-24 DIAGNOSIS — I47.1 SVT (SUPRAVENTRICULAR TACHYCARDIA) (HCC): ICD-10-CM

## 2022-10-24 PROBLEM — I47.10 SVT (SUPRAVENTRICULAR TACHYCARDIA): Status: ACTIVE | Noted: 2022-10-24

## 2022-10-24 LAB
ATRIAL RATE: 61 BPM
ATRIAL RATE: 72 BPM
ATRIAL RATE: 79 BPM
CALCULATED P AXIS, ECG09: 49 DEGREES
CALCULATED P AXIS, ECG09: 59 DEGREES
CALCULATED P AXIS, ECG09: 63 DEGREES
CALCULATED R AXIS, ECG10: 40 DEGREES
CALCULATED R AXIS, ECG10: 49 DEGREES
CALCULATED R AXIS, ECG10: 61 DEGREES
CALCULATED T AXIS, ECG11: 25 DEGREES
CALCULATED T AXIS, ECG11: 38 DEGREES
CALCULATED T AXIS, ECG11: 54 DEGREES
DIAGNOSIS, 93000: NORMAL
GLUCOSE BLD STRIP.AUTO-MCNC: 111 MG/DL (ref 65–100)
GLUCOSE BLD STRIP.AUTO-MCNC: 76 MG/DL (ref 65–100)
P-R INTERVAL, ECG05: 136 MS
P-R INTERVAL, ECG05: 138 MS
P-R INTERVAL, ECG05: 142 MS
PERFORMED BY, TECHID: ABNORMAL
PERFORMED BY, TECHID: NORMAL
Q-T INTERVAL, ECG07: 384 MS
Q-T INTERVAL, ECG07: 406 MS
Q-T INTERVAL, ECG07: 408 MS
QRS DURATION, ECG06: 100 MS
QRS DURATION, ECG06: 94 MS
QRS DURATION, ECG06: 94 MS
QTC CALCULATION (BEZET), ECG08: 410 MS
QTC CALCULATION (BEZET), ECG08: 440 MS
QTC CALCULATION (BEZET), ECG08: 444 MS
VENTRICULAR RATE, ECG03: 61 BPM
VENTRICULAR RATE, ECG03: 72 BPM
VENTRICULAR RATE, ECG03: 79 BPM

## 2022-10-24 PROCEDURE — 74011000250 HC RX REV CODE- 250: Performed by: INTERNAL MEDICINE

## 2022-10-24 PROCEDURE — 82962 GLUCOSE BLOOD TEST: CPT

## 2022-10-24 PROCEDURE — 77030027107 HC PTCH EXT REF CARTO3 J&J -F: Performed by: INTERNAL MEDICINE

## 2022-10-24 PROCEDURE — C1760 CLOSURE DEV, VASC: HCPCS | Performed by: INTERNAL MEDICINE

## 2022-10-24 PROCEDURE — 74011250636 HC RX REV CODE- 250/636: Performed by: ANESTHESIOLOGY

## 2022-10-24 PROCEDURE — 93613 INTRACARDIAC EPHYS 3D MAPG: CPT | Performed by: INTERNAL MEDICINE

## 2022-10-24 PROCEDURE — 76937 US GUIDE VASCULAR ACCESS: CPT | Performed by: INTERNAL MEDICINE

## 2022-10-24 PROCEDURE — 93623 PRGRMD STIMJ&PACG IV RX NFS: CPT | Performed by: INTERNAL MEDICINE

## 2022-10-24 PROCEDURE — 74011250636 HC RX REV CODE- 250/636: Performed by: INTERNAL MEDICINE

## 2022-10-24 PROCEDURE — C1733 CATH, EP, OTHR THAN COOL-TIP: HCPCS | Performed by: INTERNAL MEDICINE

## 2022-10-24 PROCEDURE — 76210000024 HC REC RM PH II 2.5 TO 3 HR: Performed by: INTERNAL MEDICINE

## 2022-10-24 PROCEDURE — 77030018729 HC ELECTRD DEFIB PAD CARD -B: Performed by: INTERNAL MEDICINE

## 2022-10-24 PROCEDURE — 93653 COMPRE EP EVAL TX SVT: CPT | Performed by: INTERNAL MEDICINE

## 2022-10-24 PROCEDURE — 76210000006 HC OR PH I REC 0.5 TO 1 HR: Performed by: INTERNAL MEDICINE

## 2022-10-24 PROCEDURE — C1732 CATH, EP, DIAG/ABL, 3D/VECT: HCPCS | Performed by: INTERNAL MEDICINE

## 2022-10-24 PROCEDURE — 93005 ELECTROCARDIOGRAM TRACING: CPT

## 2022-10-24 PROCEDURE — C1730 CATH, EP, 19 OR FEW ELECT: HCPCS | Performed by: INTERNAL MEDICINE

## 2022-10-24 PROCEDURE — C1894 INTRO/SHEATH, NON-LASER: HCPCS | Performed by: INTERNAL MEDICINE

## 2022-10-24 PROCEDURE — 77030035291 HC TBNG PMP SMARTABLATE J&J -B: Performed by: INTERNAL MEDICINE

## 2022-10-24 PROCEDURE — 76060000037 HC ANESTHESIA 3 TO 3.5 HR: Performed by: INTERNAL MEDICINE

## 2022-10-24 PROCEDURE — 74011000250 HC RX REV CODE- 250: Performed by: ANESTHESIOLOGY

## 2022-10-24 PROCEDURE — 74011250637 HC RX REV CODE- 250/637: Performed by: INTERNAL MEDICINE

## 2022-10-24 PROCEDURE — 77030013079 HC BLNKT BAIR HGGR 3M -A: Performed by: INTERNAL MEDICINE

## 2022-10-24 RX ORDER — SODIUM CHLORIDE 9 MG/ML
INJECTION, SOLUTION INTRAVENOUS
Status: DISCONTINUED | OUTPATIENT
Start: 2022-10-24 | End: 2022-10-24 | Stop reason: HOSPADM

## 2022-10-24 RX ORDER — DEXAMETHASONE SODIUM PHOSPHATE 4 MG/ML
INJECTION, SOLUTION INTRA-ARTICULAR; INTRALESIONAL; INTRAMUSCULAR; INTRAVENOUS; SOFT TISSUE AS NEEDED
Status: DISCONTINUED | OUTPATIENT
Start: 2022-10-24 | End: 2022-10-24 | Stop reason: HOSPADM

## 2022-10-24 RX ORDER — SODIUM CHLORIDE 0.9 % (FLUSH) 0.9 %
5-40 SYRINGE (ML) INJECTION AS NEEDED
Status: DISCONTINUED | OUTPATIENT
Start: 2022-10-24 | End: 2022-10-24 | Stop reason: HOSPADM

## 2022-10-24 RX ORDER — PROPOFOL 10 MG/ML
INJECTION, EMULSION INTRAVENOUS
Status: COMPLETED
Start: 2022-10-24 | End: 2022-10-24

## 2022-10-24 RX ORDER — ALBUTEROL SULFATE 2.5 MG/.5ML
2.5 SOLUTION RESPIRATORY (INHALATION) AS NEEDED
Status: DISCONTINUED | OUTPATIENT
Start: 2022-10-24 | End: 2022-10-24 | Stop reason: HOSPADM

## 2022-10-24 RX ORDER — ONDANSETRON 2 MG/ML
4 INJECTION INTRAMUSCULAR; INTRAVENOUS AS NEEDED
Status: DISCONTINUED | OUTPATIENT
Start: 2022-10-24 | End: 2022-10-24 | Stop reason: HOSPADM

## 2022-10-24 RX ORDER — FAMOTIDINE 10 MG/ML
INJECTION INTRAVENOUS AS NEEDED
Status: DISCONTINUED | OUTPATIENT
Start: 2022-10-24 | End: 2022-10-24 | Stop reason: HOSPADM

## 2022-10-24 RX ORDER — SODIUM CHLORIDE 9 MG/ML
50 INJECTION, SOLUTION INTRAVENOUS CONTINUOUS
Status: DISCONTINUED | OUTPATIENT
Start: 2022-10-24 | End: 2022-10-24 | Stop reason: HOSPADM

## 2022-10-24 RX ORDER — DIPHENHYDRAMINE HYDROCHLORIDE 50 MG/ML
12.5 INJECTION, SOLUTION INTRAMUSCULAR; INTRAVENOUS AS NEEDED
Status: DISCONTINUED | OUTPATIENT
Start: 2022-10-24 | End: 2022-10-24 | Stop reason: HOSPADM

## 2022-10-24 RX ORDER — HEPARIN SODIUM 200 [USP'U]/100ML
INJECTION, SOLUTION INTRAVENOUS
Status: COMPLETED | OUTPATIENT
Start: 2022-10-24 | End: 2022-10-24

## 2022-10-24 RX ORDER — FENTANYL CITRATE 50 UG/ML
INJECTION, SOLUTION INTRAMUSCULAR; INTRAVENOUS AS NEEDED
Status: DISCONTINUED | OUTPATIENT
Start: 2022-10-24 | End: 2022-10-24 | Stop reason: HOSPADM

## 2022-10-24 RX ORDER — SODIUM CHLORIDE 0.9 % (FLUSH) 0.9 %
5-40 SYRINGE (ML) INJECTION EVERY 8 HOURS
Status: DISCONTINUED | OUTPATIENT
Start: 2022-10-24 | End: 2022-10-24 | Stop reason: HOSPADM

## 2022-10-24 RX ORDER — FENTANYL CITRATE 50 UG/ML
50 INJECTION, SOLUTION INTRAMUSCULAR; INTRAVENOUS
Status: DISCONTINUED | OUTPATIENT
Start: 2022-10-24 | End: 2022-10-24 | Stop reason: HOSPADM

## 2022-10-24 RX ORDER — MIDAZOLAM HYDROCHLORIDE 1 MG/ML
0.5 INJECTION, SOLUTION INTRAMUSCULAR; INTRAVENOUS
Status: DISCONTINUED | OUTPATIENT
Start: 2022-10-24 | End: 2022-10-24 | Stop reason: HOSPADM

## 2022-10-24 RX ORDER — ACETAMINOPHEN 325 MG/1
650 TABLET ORAL
Status: DISCONTINUED | OUTPATIENT
Start: 2022-10-24 | End: 2022-10-24 | Stop reason: HOSPADM

## 2022-10-24 RX ORDER — PROPOFOL 10 MG/ML
INJECTION, EMULSION INTRAVENOUS AS NEEDED
Status: DISCONTINUED | OUTPATIENT
Start: 2022-10-24 | End: 2022-10-24 | Stop reason: HOSPADM

## 2022-10-24 RX ORDER — ONDANSETRON 2 MG/ML
INJECTION INTRAMUSCULAR; INTRAVENOUS AS NEEDED
Status: DISCONTINUED | OUTPATIENT
Start: 2022-10-24 | End: 2022-10-24 | Stop reason: HOSPADM

## 2022-10-24 RX ORDER — OXYCODONE AND ACETAMINOPHEN 5; 325 MG/1; MG/1
2 TABLET ORAL AS NEEDED
Status: DISCONTINUED | OUTPATIENT
Start: 2022-10-24 | End: 2022-10-24 | Stop reason: HOSPADM

## 2022-10-24 RX ORDER — HYDROMORPHONE HYDROCHLORIDE 1 MG/ML
0.5 INJECTION, SOLUTION INTRAMUSCULAR; INTRAVENOUS; SUBCUTANEOUS
Status: DISCONTINUED | OUTPATIENT
Start: 2022-10-24 | End: 2022-10-24 | Stop reason: HOSPADM

## 2022-10-24 RX ORDER — LIDOCAINE HYDROCHLORIDE 10 MG/ML
INJECTION INFILTRATION; PERINEURAL AS NEEDED
Status: DISCONTINUED | OUTPATIENT
Start: 2022-10-24 | End: 2022-10-24 | Stop reason: HOSPADM

## 2022-10-24 RX ADMIN — FENTANYL CITRATE 25 MCG: 0.05 INJECTION, SOLUTION INTRAMUSCULAR; INTRAVENOUS at 09:00

## 2022-10-24 RX ADMIN — ONDANSETRON 4 MG: 2 INJECTION INTRAMUSCULAR; INTRAVENOUS at 09:00

## 2022-10-24 RX ADMIN — PROPOFOL 100 MG: 10 INJECTION, EMULSION INTRAVENOUS at 08:25

## 2022-10-24 RX ADMIN — PROPOFOL 50 MG: 10 INJECTION, EMULSION INTRAVENOUS at 08:24

## 2022-10-24 RX ADMIN — SODIUM CHLORIDE 50 ML/HR: 9 INJECTION, SOLUTION INTRAVENOUS at 07:11

## 2022-10-24 RX ADMIN — PHENYLEPHRINE HYDROCHLORIDE 200 MCG: 10 INJECTION INTRAVENOUS at 09:55

## 2022-10-24 RX ADMIN — ACETAMINOPHEN 650 MG: 325 TABLET ORAL at 12:13

## 2022-10-24 RX ADMIN — PHENYLEPHRINE HYDROCHLORIDE 200 MCG: 10 INJECTION INTRAVENOUS at 09:11

## 2022-10-24 RX ADMIN — DEXAMETHASONE SODIUM PHOSPHATE 4 MG: 4 INJECTION, SOLUTION INTRA-ARTICULAR; INTRALESIONAL; INTRAMUSCULAR; INTRAVENOUS; SOFT TISSUE at 09:00

## 2022-10-24 RX ADMIN — PHENYLEPHRINE HYDROCHLORIDE 200 MCG: 10 INJECTION INTRAVENOUS at 10:19

## 2022-10-24 RX ADMIN — FENTANYL CITRATE 50 MCG: 0.05 INJECTION, SOLUTION INTRAMUSCULAR; INTRAVENOUS at 10:00

## 2022-10-24 RX ADMIN — SODIUM CHLORIDE: 9 INJECTION, SOLUTION INTRAVENOUS at 07:50

## 2022-10-24 RX ADMIN — PROPOFOL 100 MG: 10 INJECTION, EMULSION INTRAVENOUS at 08:27

## 2022-10-24 RX ADMIN — PHENYLEPHRINE HYDROCHLORIDE 200 MCG: 10 INJECTION INTRAVENOUS at 09:06

## 2022-10-24 RX ADMIN — FAMOTIDINE 20 MG: 10 INJECTION INTRAVENOUS at 09:00

## 2022-10-24 RX ADMIN — SODIUM CHLORIDE 1 MCG/MIN: 9 INJECTION, SOLUTION INTRAVENOUS at 10:10

## 2022-10-24 RX ADMIN — FENTANYL CITRATE 25 MCG: 0.05 INJECTION, SOLUTION INTRAMUSCULAR; INTRAVENOUS at 08:23

## 2022-10-24 RX ADMIN — PHENYLEPHRINE HYDROCHLORIDE 200 MCG: 10 INJECTION INTRAVENOUS at 09:50

## 2022-10-24 RX ADMIN — FENTANYL CITRATE 25 MCG: 0.05 INJECTION, SOLUTION INTRAMUSCULAR; INTRAVENOUS at 10:25

## 2022-10-24 RX ADMIN — FENTANYL CITRATE 25 MCG: 0.05 INJECTION, SOLUTION INTRAMUSCULAR; INTRAVENOUS at 10:20

## 2022-10-24 RX ADMIN — FENTANYL CITRATE 50 MCG: 0.05 INJECTION, SOLUTION INTRAMUSCULAR; INTRAVENOUS at 08:20

## 2022-10-24 RX ADMIN — PHENYLEPHRINE HYDROCHLORIDE 200 MCG: 10 INJECTION INTRAVENOUS at 09:45

## 2022-10-24 RX ADMIN — PROPOFOL 50 MG: 10 INJECTION, EMULSION INTRAVENOUS at 08:23

## 2022-10-24 RX ADMIN — PHENYLEPHRINE HYDROCHLORIDE 300 MCG: 10 INJECTION INTRAVENOUS at 10:21

## 2022-10-24 RX ADMIN — PHENYLEPHRINE HYDROCHLORIDE 200 MCG: 10 INJECTION INTRAVENOUS at 09:40

## 2022-10-24 NOTE — ANESTHESIA POSTPROCEDURE EVALUATION
Procedure(s):  ABLATION SVT  ULTRASOUND GUIDED VASCULAR ACCESS  DRUG STIMULATION  EP 3D MAPPING.     total IV anesthesia, MAC    Anesthesia Post Evaluation      Multimodal analgesia: multimodal analgesia used between 6 hours prior to anesthesia start to PACU discharge  Patient location during evaluation: PACU  Patient participation: complete - patient participated  Level of consciousness: awake  Pain score: 0  Pain management: adequate  Airway patency: patent  Anesthetic complications: no  Cardiovascular status: acceptable  Respiratory status: acceptable  Hydration status: acceptable  Post anesthesia nausea and vomiting:  controlled  Final Post Anesthesia Temperature Assessment:  Normothermia (36.0-37.5 degrees C)      INITIAL Post-op Vital signs:   Vitals Value Taken Time   /90 10/24/22 1059   Temp     Pulse 98 10/24/22 1059   Resp 17 10/24/22 1059   SpO2 100 % 10/24/22 1059

## 2022-10-24 NOTE — DISCHARGE INSTRUCTIONS
Procedure name: You had an SVT ablation with Dr. Rosa Isela Farias on 10/24/22. Activity restrictions for the next 5 days:    - No lifting greater than 10 pounds  - Do no strain or participate in vigorous activity  - Do not sit in a bathtub, hot tub, or go into a swimming pool. Driving Instructions:    - No driving for 24 hours after your procedure    Symptom management - What to expect:    - Soreness or tenderness at the site that may last for several weeks. - Bruising at the site that may take 2-3 weeks to go away. - A small lump or bump (dime to quarter size), which may last up to 6 weeks. Please let us know if you have new or increasing pain at the groin site. - For MINOR pain: You may take acetaminophen (Tylenol®) 325 mg tablets every 4-6 hours. You may place an ice pack or warm pack over the site for 20 minutes every 2 hours. Gently wipe the site after you remove the pack if it is wet. - If you are prescribed colchicine (an anti-inflammatory) after your ablation, please be aware this could cause loose stools. If you develop loose stools, please decrease your dose to 1 tablet daily. If loose stools continue after you reduce your dose, then you may discontinue this medication. What you may feel after the ablation:    - In the first 3-6 months after ablation it is not uncommon to experience transient recurrence of atrial fibrillation. Call the [facility] Cardiology if these episodes last longer than 24 hours or if they are causing bothersome symptoms.  - For several days to weeks following the ablation it is not uncommon for patients to feel new palpitations, sense of heart beat irregularly, and/or as though the arrhythmia is \"trying\" to recur. This usually resolves with time, as the inflammation and irritation improve from procedure. - Please let us know if your symptoms are increasingly bothersome or persistent. Wound care:    - You may shower 24 hours after the procedure.   - Remove the Band-Aid, or dressing, over the site before taking a shower. - For 3 days, gently clean the site with soap and water, pat dry, and leave open to air.  - Keep the site dry. - Inspect the site daily for redness, swelling, or drainage. When to call your Doctor:    - If you had anesthesia and have new difficulty swallowing, pain while swallowing, coughing up or vomiting blood, fever for unknown reasons, or mental status changes, which may occur 1-4 weeks post procedure, seek medical care immediately. - If you had a kim catheter and you develop urinary retention, please contact our office. - If your chest discomfort becomes worse and is not relieved by the medications prescribed, call the [facility] Cardiology. - If bleeding or sudden swelling should occur at the site, apply direct pressure. If the bleeding does not stop after 10 minutes of placing constant pressure on the site, call 911 for emergency help. Keep pressure on the site until help arrives. - If your extremity becomes very swollen, cold, turns blue or you develop new numbness or weakness, call 911 for emergency help. - If you have severe pain, chest pain, new back pain, increased shortness of breath, notice any signs of infection including: redness, swelling, or drainage at the site/prolonged pain/fever over 101.0°F for two readings taken a few hours apart or have any other questions/concerns, [facility] Cardiology clinic at [phone] (Monday - Friday, 8am - 5pm). After hours, nights, weekends, and holidays, this same number is answered by the message center. Ask for the Cardiology doctor on-call. Give the  your full name and phone number with the area code. The doctor will call you back. Diet:    - Resume previous diet: Heart Healthy Diet     Medication instructions:    1. May take Metoprolol on as needed basis  2. Resume all other medications.     Follow-up:  According to previously scheduled appointment with Dr David Putnam

## 2022-10-24 NOTE — Clinical Note
Right femoral vein. Accessed successfully. Micropuncture needle used. Using doppler guidance. Number of attempts =  2.

## 2022-10-24 NOTE — ANESTHESIA PREPROCEDURE EVALUATION
Relevant Problems   CARDIOVASCULAR   (+) SVT (supraventricular tachycardia) (HCC)       Anesthetic History   No history of anesthetic complications            Review of Systems / Medical History  Patient summary reviewed, nursing notes reviewed and pertinent labs reviewed    Pulmonary        Sleep apnea           Neuro/Psych         Psychiatric history     Cardiovascular    Hypertension    Angina    Dysrhythmias (Paroxsymal) : sinus tachycardia and SVT  Hyperlipidemia      Comments: 10/21/2022 ECG:    Normal sinus rhythm   Normal ECG   When compared with ECG of 02-NOV-2019 22:45,   No significant change was found  P                    GI/Hepatic/Renal     GERD      PUD     Endo/Other    Diabetes: type 2         Other Findings   Comments: Procedure Information    Case: [id number] Date/Time: 10/24/22 0730  Procedure: ABLATION SVT - [place]  Anesthesia type: MAC  Diagnosis: SVT (supraventricular tachycardia) (HCC) (I47.1)  Pre-op diagnosis: SVT (supraventricular tachycardia) (HCC) (I47.1)  Location: [facility] EP LAB / [facility] ELECTROPHYSIOLOGY  Providers: Chela Jain MD      Medical History  AR (allergic rhinitis)  Eczema  PCOS (polycystic ovarian syndrome)  Carpal tunnel syndrome  GERD (gastroesophageal reflux disease)  Heartburn  PUD (peptic ulcer disease)  Scoliosis  Endometriosis  Essential hypertension  Hyperlipidemia  Psychiatric disorder  Diabetes ([address]  Autoimmune disease ([facility] [address] [address]  Ill-defined condition  Ill-defined condition  Tachycardia  Sleep apnea  Numbness and tingling in both hands  SVT (supraventricular tachycardia) (HCC)         Physical Exam    Airway  Mallampati: II  TM Distance: 4 - 6 cm  Neck ROM: normal range of motion   Mouth opening: Normal     Cardiovascular    Rhythm: regular  Rate: normal         Dental  No notable dental hx       Pulmonary  Breath sounds clear to auscultation               Abdominal  GI exam deferred       Other Findings            Anesthetic Plan    ASA: 3  Anesthesia type: total IV anesthesia, MAC and general - backup    Monitoring Plan: Continuous noninvasive hemodynamic monitoring      Induction: Intravenous  Anesthetic plan and risks discussed with: Patient and Spouse

## 2022-10-24 NOTE — Clinical Note
TRANSFER - OUT REPORT:     Verbal report given to: Marco Ness RN. Report consisted of patient's Situation, Background, Assessment and   Recommendations(SBAR). Opportunity for questions and clarification was provided. Patient transported with a Registered Nurse and Oxygen. Oxygen used for patient = nasal cannula, @ 2 - 6 Liters. Patient transported to: recovery.

## 2022-10-24 NOTE — Clinical Note
Left femoral vein. Accessed successfully. Micropuncture needle used. Using ultrasound guidance. Number of attempts =  4.

## 2022-10-24 NOTE — PERIOP NOTES
Patient ambulated to restroom and voided then around unit, dressings checked after ambulation, dressings clean dry and intact on both groins. Discharge instructions/education provided to , Martin Burks, he verbalized understanding and had no questions. Patient was discharged in wheelchair to home with  at main entrance of hospital via private vehicle.

## 2022-12-20 ENCOUNTER — APPOINTMENT (OUTPATIENT)
Dept: CT IMAGING | Age: 42
End: 2022-12-20
Attending: STUDENT IN AN ORGANIZED HEALTH CARE EDUCATION/TRAINING PROGRAM
Payer: MEDICAID

## 2022-12-20 ENCOUNTER — HOSPITAL ENCOUNTER (EMERGENCY)
Age: 42
Discharge: HOME | End: 2022-12-20
Attending: EMERGENCY MEDICINE
Payer: MEDICAID

## 2022-12-20 ENCOUNTER — APPOINTMENT (OUTPATIENT)
Dept: MRI IMAGING | Age: 42
End: 2022-12-20
Attending: STUDENT IN AN ORGANIZED HEALTH CARE EDUCATION/TRAINING PROGRAM
Payer: MEDICAID

## 2022-12-20 VITALS
RESPIRATION RATE: 21 BRPM | HEIGHT: 70 IN | HEART RATE: 60 BPM | OXYGEN SATURATION: 100 % | TEMPERATURE: 98 F | DIASTOLIC BLOOD PRESSURE: 91 MMHG | WEIGHT: 200 LBS | BODY MASS INDEX: 28.63 KG/M2 | SYSTOLIC BLOOD PRESSURE: 120 MMHG

## 2022-12-20 DIAGNOSIS — R51.9 NONINTRACTABLE HEADACHE, UNSPECIFIED CHRONICITY PATTERN, UNSPECIFIED HEADACHE TYPE: ICD-10-CM

## 2022-12-20 DIAGNOSIS — R42 DIZZINESS: Primary | ICD-10-CM

## 2022-12-20 LAB
ALBUMIN SERPL-MCNC: 3.7 G/DL (ref 3.5–5.2)
ALBUMIN/GLOB SERPL: 1.3 {RATIO} (ref 1.1–2.2)
ALP SERPL-CCNC: 66 U/L (ref 35–104)
ALT SERPL-CCNC: 21 U/L (ref 10–35)
ANION GAP SERPL CALC-SCNC: 7 MMOL/L (ref 5–15)
APPEARANCE UR: ABNORMAL
AST SERPL-CCNC: 21 U/L (ref 10–35)
ATRIAL RATE: 58 BPM
BASOPHILS # BLD: 0 K/UL (ref 0–0.1)
BASOPHILS NFR BLD: 1 % (ref 0–1)
BILIRUB SERPL-MCNC: 0.2 MG/DL (ref 0.2–1)
BILIRUB UR QL CFM: NEGATIVE
BUN SERPL-MCNC: 11 MG/DL (ref 6–20)
BUN/CREAT SERPL: 10 (ref 12–20)
CALCIUM SERPL-MCNC: 8.8 MG/DL (ref 8.6–10)
CALCULATED P AXIS, ECG09: 59 DEGREES
CALCULATED R AXIS, ECG10: 45 DEGREES
CALCULATED T AXIS, ECG11: 38 DEGREES
CHLORIDE SERPL-SCNC: 107 MMOL/L (ref 98–107)
CO2 SERPL-SCNC: 30 MMOL/L (ref 22–29)
COLOR UR: ABNORMAL
COMMENT, HOLDF: NORMAL
CREAT SERPL-MCNC: 1.05 MG/DL (ref 0.5–0.9)
DIAGNOSIS, 93000: NORMAL
DIFFERENTIAL METHOD BLD: NORMAL
EOSINOPHIL # BLD: 0.1 K/UL (ref 0–0.4)
EOSINOPHIL NFR BLD: 3 % (ref 0–7)
ERYTHROCYTE [DISTWIDTH] IN BLOOD BY AUTOMATED COUNT: 13.8 % (ref 11.5–14.5)
GLOBULIN SER CALC-MCNC: 2.8 G/DL (ref 2–4)
GLUCOSE SERPL-MCNC: 88 MG/DL (ref 65–100)
GLUCOSE UR STRIP.AUTO-MCNC: NEGATIVE MG/DL
HCT VFR BLD AUTO: 38.8 % (ref 35–47)
HGB BLD-MCNC: 12 G/DL (ref 11.5–16)
HGB UR QL STRIP: NEGATIVE
IMM GRANULOCYTES # BLD AUTO: 0 K/UL (ref 0–0.04)
IMM GRANULOCYTES NFR BLD AUTO: 0 % (ref 0–0.5)
KETONES UR QL STRIP.AUTO: ABNORMAL MG/DL
LEUKOCYTE ESTERASE UR QL STRIP.AUTO: NEGATIVE
LYMPHOCYTES # BLD: 1.8 K/UL (ref 0.8–3.5)
LYMPHOCYTES NFR BLD: 39 % (ref 12–49)
MAGNESIUM SERPL-MCNC: 2 MG/DL (ref 1.6–2.6)
MCH RBC QN AUTO: 27.9 PG (ref 26–34)
MCHC RBC AUTO-ENTMCNC: 30.9 G/DL (ref 30–36.5)
MCV RBC AUTO: 90.2 FL (ref 80–99)
MONOCYTES # BLD: 0.5 K/UL (ref 0–1)
MONOCYTES NFR BLD: 12 % (ref 5–13)
NEUTS SEG # BLD: 2.1 K/UL (ref 1.8–8)
NEUTS SEG NFR BLD: 45 % (ref 32–75)
NITRITE UR QL STRIP.AUTO: NEGATIVE
NRBC # BLD: 0 K/UL (ref 0–0.01)
NRBC BLD-RTO: 0 PER 100 WBC
P-R INTERVAL, ECG05: 154 MS
PH UR STRIP: 5.5 [PH] (ref 5–8)
PLATELET # BLD AUTO: 236 K/UL (ref 150–400)
PMV BLD AUTO: 9.9 FL (ref 8.9–12.9)
POTASSIUM SERPL-SCNC: 4 MMOL/L (ref 3.5–5.1)
PROT SERPL-MCNC: 6.5 G/DL (ref 6.4–8.3)
PROT UR STRIP-MCNC: NEGATIVE MG/DL
Q-T INTERVAL, ECG07: 414 MS
QRS DURATION, ECG06: 86 MS
QTC CALCULATION (BEZET), ECG08: 406 MS
RBC # BLD AUTO: 4.3 M/UL (ref 3.8–5.2)
SAMPLES BEING HELD,HOLD: NORMAL
SODIUM SERPL-SCNC: 144 MMOL/L (ref 136–145)
SP GR UR REFRACTOMETRY: 1.03 (ref 1–1.03)
TROPONIN I BLD-MCNC: <0.04 NG/ML (ref 0–0.08)
UR CULT HOLD, URHOLD: NORMAL
UROBILINOGEN UR QL STRIP.AUTO: 0.2 EU/DL (ref 0.2–1)
VENTRICULAR RATE, ECG03: 58 BPM
WBC # BLD AUTO: 4.6 K/UL (ref 3.6–11)

## 2022-12-20 PROCEDURE — 93005 ELECTROCARDIOGRAM TRACING: CPT

## 2022-12-20 PROCEDURE — 96374 THER/PROPH/DIAG INJ IV PUSH: CPT

## 2022-12-20 PROCEDURE — 99285 EMERGENCY DEPT VISIT HI MDM: CPT

## 2022-12-20 PROCEDURE — 74011250636 HC RX REV CODE- 250/636: Performed by: EMERGENCY MEDICINE

## 2022-12-20 PROCEDURE — 74011250636 HC RX REV CODE- 250/636: Performed by: STUDENT IN AN ORGANIZED HEALTH CARE EDUCATION/TRAINING PROGRAM

## 2022-12-20 PROCEDURE — 36415 COLL VENOUS BLD VENIPUNCTURE: CPT

## 2022-12-20 PROCEDURE — 80053 COMPREHEN METABOLIC PANEL: CPT

## 2022-12-20 PROCEDURE — 85025 COMPLETE CBC W/AUTO DIFF WBC: CPT

## 2022-12-20 PROCEDURE — A9576 INJ PROHANCE MULTIPACK: HCPCS | Performed by: EMERGENCY MEDICINE

## 2022-12-20 PROCEDURE — 70496 CT ANGIOGRAPHY HEAD: CPT

## 2022-12-20 PROCEDURE — 83735 ASSAY OF MAGNESIUM: CPT

## 2022-12-20 PROCEDURE — 74011000636 HC RX REV CODE- 636: Performed by: EMERGENCY MEDICINE

## 2022-12-20 PROCEDURE — 70553 MRI BRAIN STEM W/O & W/DYE: CPT

## 2022-12-20 PROCEDURE — 81003 URINALYSIS AUTO W/O SCOPE: CPT

## 2022-12-20 PROCEDURE — 70450 CT HEAD/BRAIN W/O DYE: CPT

## 2022-12-20 RX ORDER — KETOROLAC TROMETHAMINE 30 MG/ML
15 INJECTION, SOLUTION INTRAMUSCULAR; INTRAVENOUS
Status: COMPLETED | OUTPATIENT
Start: 2022-12-20 | End: 2022-12-20

## 2022-12-20 RX ADMIN — KETOROLAC TROMETHAMINE 15 MG: 30 INJECTION, SOLUTION INTRAMUSCULAR; INTRAVENOUS at 15:33

## 2022-12-20 RX ADMIN — SODIUM CHLORIDE 1000 ML: 9 INJECTION, SOLUTION INTRAVENOUS at 13:16

## 2022-12-20 RX ADMIN — IOPAMIDOL 100 ML: 755 INJECTION, SOLUTION INTRAVENOUS at 12:27

## 2022-12-20 RX ADMIN — GADOTERIDOL 19 ML: 279.3 INJECTION, SOLUTION INTRAVENOUS at 14:26

## 2022-12-20 NOTE — ED TRIAGE NOTES
Patient reports ablation 2 months ago and has had no issues sine but today woke up feeling fine but while at work began with a sudden onset dizziness and headache. Hx of migraines. Adds bilateral blurred vision.

## 2022-12-20 NOTE — ED NOTES
Patient returned to room, patient NIHSS unchanged. Patient unable to be monitored for stroke like sx by RN during MRI.

## 2022-12-20 NOTE — ED PROVIDER NOTES
Dizziness  Pertinent negatives include no speech difficulty. Associated symptoms include confusion and headaches. Pertinent negatives include no shortness of breath, no chest pain, no vomiting and no nausea. Patient is a 38 y.o. F with past medical history of SVT status post ablation 2 months ago, peripheral neuropathy, fibromyalgia, diabetes controlled with diet, major depressive disorder who presents today with complaints of headache, lightheadedness, dizziness, ataxia. She states the last couple days, has felt it has been difficult to walk, has been off balance, had not had any falls. Today while at work, she began with headache, also felt confused, difficulty concentrating, difficulty thinking. States she went to the office at work, sat down for movement. When she stood up, she felt lightheaded and had a syncopal episode. Unsure how long she was unresponsive for but no more than a couple of moments. At present, feels dizzy, headache, \"mental fog,\" confusion. Denies any focal weakness, abdominal pain, vomiting, seizure, chest pain, cough.     PMH: SVT status post ablation 2 months ago, peripheral neuropathy, fibromyalgia, diabetes controlled with diet, major depressive disorder  Surgical History: None  Smoking: None  Alcohol: None  Drug Use: None  ALLERGIES: Atenolol, Bactrim [sulfamethoxazole-trimethoprim], Lisinopril, Omnicef [cefdinir], and Toprol xl [metoprolol succinate]    Past Medical History:   Diagnosis Date    AR (allergic rhinitis)     Autoimmune disease ([facility]     fibromyalgia    Carpal tunnel syndrome     Diabetes ([facility] [address]     Eczema     Endometriosis     Essential hypertension     GERD (gastroesophageal reflux disease)     Heartburn     Hyperlipidemia     Ill-defined condition     peripheral neuropathy    Ill-defined condition     cyst on spine    Numbness and tingling in both hands     hands and feet    PCOS (polycystic ovarian syndrome)     Psychiatric disorder     depression    PUD (peptic ulcer disease)     Scoliosis     Sleep apnea     resolved since gastric bypass surgery    SVT (supraventricular tachycardia) (HCC)     Tachycardia      Past Surgical History:   Procedure Laterality Date    HX HYSTERECTOMY      HX ORTHOPAEDIC  1994    Spinal fusion    HX OTHER SURGICAL      wisdom teeth extraction    [place] BIOPSY OF Odilon Melton EXPOS  2004    [place] ENDOCRINE SURGERY GASTRIC RESTRICTION PROC UNLISTED       Family History:   Problem Relation Age of Onset    Asthma Sister     Cancer Maternal Grandmother         breast    Diabetes Mother     Cancer Mother     Hypertension Father     High Cholesterol Father      Social History     Socioeconomic History    Marital status:      Spouse name: Not on file    Number of children: Not on file    Years of education: Not on file    Highest education level: Not on file   Occupational History    Not on file   Tobacco Use    Smoking status: Never    Smokeless tobacco: Never   Substance and Sexual Activity    Alcohol use: No    Drug use: No    Sexual activity: Yes     Partners: Male   Other Topics Concern    Not on file   Social History Narrative    ** Merged History Encounter **          Social Determinants of Health     Financial Resource Strain: Not on file   Food Insecurity: Not on file   Transportation Needs: Not on file   Physical Activity: Not on file   Stress: Not on file   Social Connections: Not on file   Intimate Partner Violence: Not on file   Housing Stability: Not on file           Review of Systems   Constitutional:  Negative for fatigue and fever. HENT:  Negative for congestion. Respiratory:  Negative for cough, shortness of breath and wheezing. Cardiovascular:  Negative for chest pain. Gastrointestinal:  Negative for abdominal pain, constipation, diarrhea, nausea and vomiting. Genitourinary:  Negative for flank pain. Musculoskeletal:  Negative for arthralgias and myalgias. Skin:  Negative for wound.    Neurological:  Positive for dizziness, syncope and headaches. Negative for seizures, facial asymmetry, speech difficulty, weakness, light-headedness and numbness. Psychiatric/Behavioral:  Positive for confusion. Vitals:    12/20/22 1125 12/20/22 1137   BP: 117/79 103/78   Pulse: (!) 59 (!) 56   Resp: 16    Temp: 98 °F (36.7 °C)    SpO2: 99%    Weight: 90.7 kg (200 lb)    Height: 5' 10\" (1.778 m)             Physical Exam  Vitals and nursing note reviewed. Constitutional:       General: She is not in acute distress. Appearance: Normal appearance. She is not ill-appearing, toxic-appearing or diaphoretic. HENT:      Head: Normocephalic and atraumatic. Nose: Nose normal.      Mouth/Throat:      Mouth: Mucous membranes are moist.   Eyes:      Pupils: Pupils are equal, round, and reactive to light. Cardiovascular:      Rate and Rhythm: Normal rate and regular rhythm. Pulmonary:      Effort: Pulmonary effort is normal. No respiratory distress. Breath sounds: Normal breath sounds. No stridor. No wheezing, rhonchi or rales. Chest:      Chest wall: No tenderness. Musculoskeletal:         General: Normal range of motion. Cervical back: Normal range of motion. Skin:     General: Skin is warm and dry. Neurological:      Mental Status: She is alert and oriented to person, place, and time. Mental status is at baseline. Cranial Nerves: No cranial nerve deficit. Sensory: Sensation is intact. Motor: Motor function is intact. No pronator drift. Coordination: Finger-Nose-Finger Test and Heel to [employer] normal.   Psychiatric:         Mood and Affect: Mood normal.         Behavior: Behavior normal.         Thought Content:  Thought content normal.            LABORATORY RESULTS:  Recent Results (from the past 24 hour(s))   CBC WITH AUTOMATED DIFF    Collection Time: 12/20/22 11:34 AM   Result Value Ref Range    WBC 4.6 3.6 - 11.0 K/uL    RBC 4.30 3.80 - 5.20 M/uL    HGB 12.0 11.5 - 16.0 g/dL    HCT 38.8 35.0 - 47.0 %    MCV 90.2 80.0 - 99.0 FL    MCH 27.9 26.0 - 34.0 PG    MCHC 30.9 30.0 - 36.5 g/dL    RDW 13.8 11.5 - 14.5 %    PLATELET 850 680 - 694 K/uL    MPV 9.9 8.9 - 12.9 FL    NRBC 0.0 0  WBC    ABSOLUTE NRBC 0.00 0.00 - 0.01 K/uL    NEUTROPHILS 45 32 - 75 %    LYMPHOCYTES 39 12 - 49 %    MONOCYTES 12 5 - 13 %    EOSINOPHILS 3 0 - 7 %    BASOPHILS 1 0 - 1 %    IMMATURE GRANULOCYTES 0 0.0 - 0.5 %    ABS. NEUTROPHILS 2.1 1.8 - 8.0 K/UL    ABS. LYMPHOCYTES 1.8 0.8 - 3.5 K/UL    ABS. MONOCYTES 0.5 0.0 - 1.0 K/UL    ABS. EOSINOPHILS 0.1 0.0 - 0.4 K/UL    ABS. BASOPHILS 0.0 0.0 - 0.1 K/UL    ABS. IMM. GRANS. 0.0 0.00 - 0.04 K/UL    DF AUTOMATED     METABOLIC PANEL, COMPREHENSIVE    Collection Time: 12/20/22 11:34 AM   Result Value Ref Range    Sodium 144 136 - 145 mmol/L    Potassium 4.0 3.5 - 5.1 mmol/L    Chloride 107 98 - 107 mmol/L    CO2 30 (H) 22 - 29 mmol/L    Anion gap 7 5 - 15 mmol/L    Glucose 88 65 - 100 mg/dL    BUN 11 6 - 20 MG/DL    Creatinine 1.05 (H) 0.50 - 0.90 MG/DL    BUN/Creatinine ratio 10 (L) 12 - 20      eGFR >60 >60 ml/min/1.73m2    Calcium 8.8 8.6 - 10.0 MG/DL    Bilirubin, total 0.2 0.2 - 1.0 MG/DL    ALT (SGPT) 21 10 - 35 U/L    AST (SGOT) 21 10 - 35 U/L    Alk. phosphatase 66 35 - 104 U/L    Protein, total 6.5 6.4 - 8.3 g/dL    Albumin 3.7 3.5 - 5.2 g/dL    Globulin 2.8 2.0 - 4.0 g/dL    A-G Ratio 1.3 1.1 - 2.2     MAGNESIUM    Collection Time: 12/20/22 11:34 AM   Result Value Ref Range    Magnesium 2.0 1.6 - 2.6 mg/dL   SAMPLES BEING HELD    Collection Time: 12/20/22 11:34 AM   Result Value Ref Range    SAMPLES BEING HELD 1RED 1GOLD 1BLUE     COMMENT        Add-on orders for these samples will be processed based on acceptable specimen integrity and analyte stability, which may vary by analyte. POC TROPONIN-I    Collection Time: 12/20/22 11:37 AM   Result Value Ref Range    Troponin-I (POC) <0.04 0.00 - 0.08 ng/mL       IMAGING RESULTS:  No results found.     MEDICATIONS GIVEN:  Medications   iopamidoL (ISOVUE-370) 370 mg iodine /mL (76 %) injection 100 mL (has no administration in time range)   iopamidoL (ISOVUE-370) 370 mg iodine /mL (76 %) injection 100 mL (has no administration in time range)            MDM     Amount and/or Complexity of Data Reviewed  Clinical lab tests: reviewed  Tests in the radiology section of CPT®: reviewed      Patient is a 38 y.o. F with past medical history of SVT status post ablation 2 months ago, peripheral neuropathy, fibromyalgia, diabetes controlled with diet, major depressive disorder who presents today with complaints of headache, lightheadedness, dizziness, ataxia. Neurological exam is nonfocal.  Patient is alert and oriented x3. Have ordered EKG, blood work, CTA head and neck. ED Course as of 12/20/22 1428   Tue Dec 20, 2022   1128 EKG, 12 LEAD, INITIAL  ED EKG interpretation:  Rhythm: normal sinus rhythm. Rate (approx.): 58. Axis: normal.  ST segment:  No concerning ST elevations or depressions. This EKG was interpreted by Chris Donald MD,ED Provider. [JM]   1427 CT head shows no acute process. CBC, CMP unremarkable, urine negative, troponin negative. Patient signed out to attending, Dr. Ricki Pratt pending MRI brain.  [KJ]      ED Course User Index  [JM] Justyna Basilio MD  [KJ] Reina Hwang PA-C

## 2022-12-20 NOTE — ED PROVIDER NOTES
I took over care of this patient at 3 PM from [facility]. Results of the MRI are unremarkable with no evidence of acute stroke or other acute abnormality. Patient is currently resting comfortably with stable vital signs. Review of her blood work and EKG showed no concerning abnormalities. Her symptoms could be due to headache or possibly dehydration. She is feeling much better now and I think she stable for discharge home. She can follow-up with her primary care doctor as needed or return to the ER if there is any new or worsening symptoms.

## 2022-12-20 NOTE — Clinical Note
[address]  [facility] EMERGENCY [facility]PT  [address]  [place] [address] [phone]    Work/School Note    Date: 12/20/2022    To Whom It May concern: Colin Thornton was seen and treated today in the emergency room by the following provider(s):  Attending Provider: Ganesh Hubbard MD  Physician Assistant: Faisal Maciel PA-C. Colin Thornton is excused from work/school on 12/20/22 and 12/21/22. She is medically clear to return to work/school on 12/22/2022.        Sincerely,          Miroslava Bah MD

## 2022-12-20 NOTE — ED NOTES
RN did not receive report from offgoing RN. This RN performed NIHSS at time of assumption of care from offgoing RN. No code neuro called.

## 2023-03-21 ENCOUNTER — APPOINTMENT (OUTPATIENT)
Dept: CT IMAGING | Age: 43
End: 2023-03-21
Attending: STUDENT IN AN ORGANIZED HEALTH CARE EDUCATION/TRAINING PROGRAM
Payer: MEDICAID

## 2023-03-21 ENCOUNTER — HOSPITAL ENCOUNTER (EMERGENCY)
Age: 43
Discharge: HOME | End: 2023-03-21
Attending: EMERGENCY MEDICINE
Payer: MEDICAID

## 2023-03-21 VITALS
HEART RATE: 69 BPM | SYSTOLIC BLOOD PRESSURE: 109 MMHG | RESPIRATION RATE: 14 BRPM | BODY MASS INDEX: 28.92 KG/M2 | DIASTOLIC BLOOD PRESSURE: 81 MMHG | HEIGHT: 70 IN | OXYGEN SATURATION: 100 % | TEMPERATURE: 97.8 F | WEIGHT: 202 LBS

## 2023-03-21 DIAGNOSIS — R10.13 ABDOMINAL PAIN, EPIGASTRIC: Primary | ICD-10-CM

## 2023-03-21 LAB
ALBUMIN SERPL-MCNC: 3.9 G/DL (ref 3.5–5.2)
ALBUMIN/GLOB SERPL: 1.3 (ref 1.1–2.2)
ALP SERPL-CCNC: 69 U/L (ref 35–104)
ALT SERPL-CCNC: 19 U/L (ref 10–35)
ANION GAP SERPL CALC-SCNC: 7 MMOL/L (ref 5–15)
APPEARANCE UR: CLEAR
AST SERPL-CCNC: 18 U/L (ref 10–35)
ATRIAL RATE: 61 BPM
BACTERIA URNS QL MICRO: NEGATIVE /HPF
BASOPHILS # BLD: 0 K/UL (ref 0–1)
BASOPHILS NFR BLD: 1 % (ref 0–1)
BILIRUB SERPL-MCNC: 0.3 MG/DL (ref 0.2–1)
BILIRUB UR QL: NEGATIVE
BUN SERPL-MCNC: 11 MG/DL (ref 6–20)
BUN/CREAT SERPL: 14 (ref 12–20)
CALCIUM SERPL-MCNC: 9.1 MG/DL (ref 8.6–10)
CALCULATED P AXIS, ECG09: 67 DEGREES
CALCULATED R AXIS, ECG10: 69 DEGREES
CALCULATED T AXIS, ECG11: 57 DEGREES
CHLORIDE SERPL-SCNC: 106 MMOL/L (ref 98–107)
CO2 SERPL-SCNC: 31 MMOL/L (ref 22–29)
COLOR UR: ABNORMAL
CREAT SERPL-MCNC: 0.79 MG/DL (ref 0.5–0.9)
DIAGNOSIS, 93000: NORMAL
DIFFERENTIAL METHOD BLD: ABNORMAL
EOSINOPHIL # BLD: 0.1 K/UL (ref 0–0.4)
EOSINOPHIL NFR BLD: 3 %
EPITH CASTS URNS QL MICRO: ABNORMAL /LPF
ERYTHROCYTE [DISTWIDTH] IN BLOOD BY AUTOMATED COUNT: 14.2 % (ref 11.5–14.5)
GLOBULIN SER CALC-MCNC: 3.1 G/DL (ref 2–4)
GLUCOSE SERPL-MCNC: 116 MG/DL (ref 65–100)
GLUCOSE UR STRIP.AUTO-MCNC: NEGATIVE MG/DL
HCG UR QL: NEGATIVE
HCT VFR BLD AUTO: 40.4 % (ref 35–47)
HGB BLD-MCNC: 12.6 G/DL (ref 11.5–16)
HGB UR QL STRIP: NEGATIVE
IMM GRANULOCYTES # BLD AUTO: 0 K/UL (ref 0–0.04)
IMM GRANULOCYTES NFR BLD AUTO: 0 % (ref 0–0.5)
KETONES UR QL STRIP.AUTO: NEGATIVE MG/DL
LEUKOCYTE ESTERASE UR QL STRIP.AUTO: NEGATIVE
LIPASE SERPL-CCNC: 34 U/L (ref 13–60)
LYMPHOCYTES # BLD: 1.2 K/UL (ref 0.8–3.5)
LYMPHOCYTES NFR BLD: 28 % (ref 12–49)
MCH RBC QN AUTO: 27.5 PG (ref 26–34)
MCHC RBC AUTO-ENTMCNC: 31.2 G/DL (ref 30–36.5)
MCV RBC AUTO: 88.2 FL (ref 80–99)
MONOCYTES # BLD: 0.4 K/UL (ref 0–1)
MONOCYTES NFR BLD: 9 % (ref 5–13)
MUCOUS THREADS URNS QL MICRO: ABNORMAL /LPF
NEUTS SEG # BLD: 2.6 K/UL (ref 1.8–8)
NEUTS SEG NFR BLD: 59 % (ref 32–75)
NITRITE UR QL STRIP.AUTO: NEGATIVE
NRBC # BLD: 0 K/UL (ref 0–0.01)
NRBC BLD-RTO: 0 PER 100 WBC
P-R INTERVAL, ECG05: 158 MS
PH UR STRIP: 6.5 (ref 5–8)
PLATELET # BLD AUTO: 229 K/UL (ref 150–400)
PMV BLD AUTO: 9.4 FL (ref 8.9–12.9)
POTASSIUM SERPL-SCNC: 4 MMOL/L (ref 3.5–5.1)
PROT SERPL-MCNC: 7 G/DL (ref 6.4–8.3)
PROT UR STRIP-MCNC: NEGATIVE MG/DL
Q-T INTERVAL, ECG07: 422 MS
QRS DURATION, ECG06: 102 MS
QTC CALCULATION (BEZET), ECG08: 424 MS
RBC # BLD AUTO: 4.58 M/UL (ref 3.8–5.2)
RBC #/AREA URNS HPF: ABNORMAL /HPF
SODIUM SERPL-SCNC: 144 MMOL/L (ref 136–145)
SP GR UR REFRACTOMETRY: 1.02 (ref 1–1.03)
TROPONIN I BLD-MCNC: <0.04 NG/ML (ref 0–0.08)
UR CULT HOLD, URHOLD: NORMAL
UROBILINOGEN UR QL STRIP.AUTO: 1 EU/DL (ref 0.2–1)
VENTRICULAR RATE, ECG03: 61 BPM
WBC # BLD AUTO: 4.4 K/UL (ref 3.6–11)
WBC URNS QL MICRO: ABNORMAL /HPF (ref 0–4)

## 2023-03-21 PROCEDURE — 81025 URINE PREGNANCY TEST: CPT

## 2023-03-21 PROCEDURE — 80053 COMPREHEN METABOLIC PANEL: CPT

## 2023-03-21 PROCEDURE — 81001 URINALYSIS AUTO W/SCOPE: CPT

## 2023-03-21 PROCEDURE — 85025 COMPLETE CBC W/AUTO DIFF WBC: CPT

## 2023-03-21 PROCEDURE — 93005 ELECTROCARDIOGRAM TRACING: CPT

## 2023-03-21 PROCEDURE — 36415 COLL VENOUS BLD VENIPUNCTURE: CPT

## 2023-03-21 PROCEDURE — 74177 CT ABD & PELVIS W/CONTRAST: CPT

## 2023-03-21 PROCEDURE — 83690 ASSAY OF LIPASE: CPT

## 2023-03-21 PROCEDURE — 99285 EMERGENCY DEPT VISIT HI MDM: CPT

## 2023-03-21 PROCEDURE — 74011000636 HC RX REV CODE- 636: Performed by: EMERGENCY MEDICINE

## 2023-03-21 PROCEDURE — 84484 ASSAY OF TROPONIN QUANT: CPT

## 2023-03-21 RX ORDER — DICYCLOMINE HYDROCHLORIDE 20 MG/1
20 TABLET ORAL
Qty: 20 TABLET | Refills: 0 | Status: SHIPPED | OUTPATIENT
Start: 2023-03-21

## 2023-03-21 RX ORDER — FAMOTIDINE 20 MG/1
20 TABLET, FILM COATED ORAL 2 TIMES DAILY
Qty: 30 TABLET | Refills: 0 | Status: SHIPPED | OUTPATIENT
Start: 2023-03-21

## 2023-03-21 RX ADMIN — IOPAMIDOL 100 ML: 755 INJECTION, SOLUTION INTRAVENOUS at 12:08

## 2023-03-21 NOTE — Clinical Note
[address]  [facility] EMERGENCY DEPT  [address]  [place] [address] [phone]    Work/School Note    Date: 3/21/2023    To Whom It May concern: Elsa Marquez was seen and treated today in the emergency room by the following provider(s):  Attending Provider: Jose Valenzuela MD  Physician Assistant: Leatha Wise. Elsa Marquez is excused from work/school on 03/21/23 and 03/22/23. She is medically clear to return to work/school on 3/23/2023.        Sincerely,          JAVIER Roberts

## 2023-03-21 NOTE — ED PROVIDER NOTES
78-year-old female with history of fibromyalgia, T2DM, endometriosis, HTN, GERD, HLD, PCOS, MELODY and SVT presents to ED with 1 day of epigastric abdominal pain. Patient reports that starting yesterday while she was sitting at work she had some crampy upper abdominal pain. Since then the pain has become more generalized and more severe in nature. She now describes it as constant and sharp, \"as if someone is squeezing\". She also notes that yesterday for a couple hours she had a sharp, stabbing left breast pain as well. She has tried Tums with little relief. Otherwise denies any fevers, chills, shortness of breath, nausea, vomiting, diarrhea, dysuria or urinary frequency. No history of similar symptoms. She has history of gastric bypass and a hysterectomy otherwise no other abdominal surgeries. The history is provided by the patient.       Past Medical History:   Diagnosis Date    AR (allergic rhinitis)     Autoimmune disease ([address]     fibromyalgia    Carpal tunnel syndrome     Diabetes ([address]     Eczema     Endometriosis     Essential hypertension     GERD (gastroesophageal reflux disease)     Heartburn     Hyperlipidemia     Ill-defined condition     peripheral neuropathy    Ill-defined condition     cyst on spine    Numbness and tingling in both hands     hands and feet    PCOS (polycystic ovarian syndrome)     Psychiatric disorder     depression    PUD (peptic ulcer disease)     Scoliosis     Sleep apnea     resolved since gastric bypass surgery    SVT (supraventricular tachycardia) (HCC)     Tachycardia        Past Surgical History:   Procedure Laterality Date    HX HYSTERECTOMY      HX ORTHOPAEDIC  1994    Spinal fusion    HX OTHER SURGICAL      wisdom teeth extraction    [facility] BIOPSY OF Carolina Stanton EXPOS  2004    [facility] ENDOCRINE SURGERY GASTRIC RESTRICTION PROC UNLISTED           Family History:   Problem Relation Age of Onset    Asthma Sister     Cancer Maternal Grandmother         breast    Diabetes Mother Cancer Mother     Hypertension Father     High Cholesterol Father        Social History     Socioeconomic History    Marital status:      Spouse name: Not on file    Number of children: Not on file    Years of education: Not on file    Highest education level: Not on file   Occupational History    Not on file   Tobacco Use    Smoking status: Never    Smokeless tobacco: Never   Substance and Sexual Activity    Alcohol use: No    Drug use: No    Sexual activity: Yes     Partners: Male   Other Topics Concern    Not on file   Social History Narrative    ** Merged History Encounter **          Social Determinants of Health     Financial Resource Strain: Not on file   Food Insecurity: Not on file   Transportation Needs: Not on file   Physical Activity: Not on file   Stress: Not on file   Social Connections: Not on file   Intimate Partner Violence: Not on file   Housing Stability: Not on file         ALLERGIES: Atenolol, Bactrim [sulfamethoxazole-trimethoprim], Lisinopril, Omnicef [cefdinir], and Toprol xl [metoprolol succinate]    Review of Systems   Constitutional:  Negative for fever. HENT:  Negative for congestion and sinus pressure. Respiratory:  Negative for shortness of breath. Cardiovascular:  Negative for chest pain. Gastrointestinal:  Positive for abdominal pain. Negative for nausea and vomiting. Genitourinary:  Negative for dysuria. Musculoskeletal:  Negative for myalgias. Neurological:  Negative for dizziness and headaches. Hematological:  Negative for adenopathy. Psychiatric/Behavioral:  The patient is not nervous/anxious. All other systems reviewed and are negative. Vitals:    03/21/23 1102   BP: 119/77   Pulse: 69   Resp: 14   Temp: 97.8 °F (36.6 °C)   SpO2: 99%   Weight: 91.6 kg (202 lb)   Height: 5' 10\" (1.778 m)            Physical Exam  Vitals and nursing note reviewed. Constitutional:       General: She is not in acute distress. Appearance: Normal appearance. She is normal weight. HENT:      Head: Normocephalic and atraumatic. Eyes:      Extraocular Movements: Extraocular movements intact. Pupils: Pupils are equal, round, and reactive to light. Cardiovascular:      Rate and Rhythm: Normal rate and regular rhythm. Heart sounds: Normal heart sounds. Pulmonary:      Breath sounds: Normal breath sounds. Abdominal:      Palpations: Abdomen is soft. Tenderness: There is abdominal tenderness in the epigastric area. There is no guarding or rebound. Lymphadenopathy:      Cervical: No cervical adenopathy. Skin:     General: Skin is warm and dry. Neurological:      General: No focal deficit present. Mental Status: She is alert and oriented to person, place, and time. Psychiatric:         Mood and Affect: Mood normal.         Behavior: Behavior normal.         Thought Content: Thought content normal.        Medical Decision Making  67-year-old female with history of fibromyalgia, T2DM, endometriosis, HTN, GERD, HLD, PCOS, MELODY and SVT presents to ED with 1 day of epigastric abdominal pain. Vital signs notable for patient afebrile and oxygen saturation 99%. Physical exam notable for epigastric abdominal pain without guarding or rebound. Labs and urine unremarkable with no elevation in white blood cell count, troponin at less than 0.04 and no indication of urinary tract infection. See below for EKG interpretation. CT of abdomen pelvis showed no acute abnormalities. Patient did not want anything for pain while in ED. Suspect exacerbation of gastritis versus GERD. Patient will be discharged with prescription for famotidine and dicyclomine as well as instructions for conservative care, follow-up and return precautions. No socioeconomic barriers for care identified during visit. Amount and/or Complexity of Data Reviewed  Labs: ordered. Decision-making details documented in ED Course. Radiology: ordered. ECG/medicine tests: ordered. Decision-making details documented in ED Course. Risk  Prescription drug management. ED Course as of 03/21/23 1237   Tue Mar 21, 2023   1124 EKG, 12 LEAD, INITIAL  ED EKG interpretation:  Rhythm: normal sinus rhythm. Rate (approx.): 61.  Axis: normal.  ST segment:  No concerning ST elevations or depressions. This EKG was independently interpreted by Alec Golden MD,ED Provider.    [JM]   3959 Troponin-I (POC): <0.04 []      ED Course User Index  [] [facility]  [] Marlena French MD       Procedures

## 2023-03-21 NOTE — ED TRIAGE NOTES
PT has c/o lower ABD pain w/o N/V/D that started yesterday. She reports hx of ABD sx and no relief after taking tums. PT also has c/o L breast pain that feels like \"someone is squeezing\" her breast.  A&O, VS WNL.

## 2024-03-03 ENCOUNTER — APPOINTMENT (OUTPATIENT)
Facility: HOSPITAL | Age: 44
End: 2024-03-03
Payer: COMMERCIAL

## 2024-03-03 ENCOUNTER — HOSPITAL ENCOUNTER (EMERGENCY)
Facility: HOSPITAL | Age: 44
Discharge: HOME | End: 2024-03-03
Attending: STUDENT IN AN ORGANIZED HEALTH CARE EDUCATION/TRAINING PROGRAM
Payer: COMMERCIAL

## 2024-03-03 VITALS
OXYGEN SATURATION: 100 % | SYSTOLIC BLOOD PRESSURE: 120 MMHG | HEIGHT: 70 IN | HEART RATE: 63 BPM | RESPIRATION RATE: 16 BRPM | BODY MASS INDEX: 28.63 KG/M2 | TEMPERATURE: 98 F | DIASTOLIC BLOOD PRESSURE: 83 MMHG | WEIGHT: 200 LBS

## 2024-03-03 DIAGNOSIS — R10.13 EPIGASTRIC PAIN: Primary | ICD-10-CM

## 2024-03-03 LAB
ALBUMIN SERPL-MCNC: 3.3 G/DL (ref 3.5–5)
ALBUMIN/GLOB SERPL: 0.9 (ref 1.1–2.2)
ALP SERPL-CCNC: 56 U/L (ref 45–117)
ALT SERPL-CCNC: 32 U/L (ref 12–78)
ANION GAP SERPL CALC-SCNC: ABNORMAL MMOL/L (ref 5–15)
APPEARANCE UR: CLEAR
AST SERPL W P-5'-P-CCNC: 16 U/L (ref 15–37)
BACTERIA URNS QL MICRO: NEGATIVE /HPF
BASOPHILS # BLD: 0 K/UL (ref 0–0.1)
BASOPHILS NFR BLD: 0 % (ref 0–1)
BILIRUB SERPL-MCNC: 0.3 MG/DL (ref 0.2–1)
BILIRUB UR QL: NEGATIVE
BUN SERPL-MCNC: 11 MG/DL (ref 6–20)
BUN/CREAT SERPL: 11 (ref 12–20)
CA-I BLD-MCNC: 8.8 MG/DL (ref 8.5–10.1)
CHLORIDE SERPL-SCNC: 108 MMOL/L (ref 97–108)
CO2 SERPL-SCNC: 35 MMOL/L (ref 21–32)
COLOR UR: ABNORMAL
CREAT SERPL-MCNC: 0.96 MG/DL (ref 0.55–1.02)
DIFFERENTIAL METHOD BLD: ABNORMAL
EOSINOPHIL # BLD: 0.1 K/UL (ref 0–0.4)
EOSINOPHIL NFR BLD: 2 % (ref 0–7)
EPITH CASTS URNS QL MICRO: ABNORMAL /LPF
ERYTHROCYTE [DISTWIDTH] IN BLOOD BY AUTOMATED COUNT: 14.5 % (ref 11.5–14.5)
GLOBULIN SER CALC-MCNC: 3.8 G/DL (ref 2–4)
GLUCOSE SERPL-MCNC: 96 MG/DL (ref 65–100)
GLUCOSE UR STRIP.AUTO-MCNC: NEGATIVE MG/DL
HCT VFR BLD AUTO: 38.8 % (ref 35–47)
HGB BLD-MCNC: 12.1 G/DL (ref 11.5–16)
HGB UR QL STRIP: NEGATIVE
IMM GRANULOCYTES # BLD AUTO: 0 K/UL (ref 0–0.04)
IMM GRANULOCYTES NFR BLD AUTO: 1 % (ref 0–0.5)
KETONES UR QL STRIP.AUTO: NEGATIVE MG/DL
LEUKOCYTE ESTERASE UR QL STRIP.AUTO: NEGATIVE
LIPASE SERPL-CCNC: 28 U/L (ref 13–75)
LYMPHOCYTES # BLD: 2.5 K/UL (ref 0.8–3.5)
LYMPHOCYTES NFR BLD: 30 % (ref 12–49)
MCH RBC QN AUTO: 27.1 PG (ref 26–34)
MCHC RBC AUTO-ENTMCNC: 31.2 G/DL (ref 30–36.5)
MCV RBC AUTO: 87 FL (ref 80–99)
MONOCYTES # BLD: 0.6 K/UL (ref 0–1)
MONOCYTES NFR BLD: 8 % (ref 5–13)
MUCOUS THREADS URNS QL MICRO: ABNORMAL /LPF
NEUTS SEG # BLD: 5 K/UL (ref 1.8–8)
NEUTS SEG NFR BLD: 59 % (ref 32–75)
NITRITE UR QL STRIP.AUTO: NEGATIVE
NRBC # BLD: 0 K/UL (ref 0–0.01)
NRBC BLD-RTO: 0 PER 100 WBC
PH UR STRIP: 6 (ref 5–8)
PLATELET # BLD AUTO: 290 K/UL (ref 150–400)
PMV BLD AUTO: 9.6 FL (ref 8.9–12.9)
POTASSIUM SERPL-SCNC: 3.5 MMOL/L (ref 3.5–5.1)
PROT SERPL-MCNC: 7.1 G/DL (ref 6.4–8.2)
PROT UR STRIP-MCNC: NEGATIVE MG/DL
RBC # BLD AUTO: 4.46 M/UL (ref 3.8–5.2)
RBC #/AREA URNS HPF: ABNORMAL /HPF (ref 0–5)
SODIUM SERPL-SCNC: 140 MMOL/L (ref 136–145)
SP GR UR REFRACTOMETRY: 1.01 (ref 1–1.03)
URINE CULTURE IF INDICATED: ABNORMAL
UROBILINOGEN UR QL STRIP.AUTO: 0.1 EU/DL (ref 0.1–1)
WBC # BLD AUTO: 8.3 K/UL (ref 3.6–11)
WBC URNS QL MICRO: ABNORMAL /HPF (ref 0–4)

## 2024-03-03 PROCEDURE — 85025 COMPLETE CBC W/AUTO DIFF WBC: CPT

## 2024-03-03 PROCEDURE — 80053 COMPREHEN METABOLIC PANEL: CPT

## 2024-03-03 PROCEDURE — 6370000000 HC RX 637 (ALT 250 FOR IP): Performed by: STUDENT IN AN ORGANIZED HEALTH CARE EDUCATION/TRAINING PROGRAM

## 2024-03-03 PROCEDURE — 83690 ASSAY OF LIPASE: CPT

## 2024-03-03 PROCEDURE — 99285 EMERGENCY DEPT VISIT HI MDM: CPT

## 2024-03-03 PROCEDURE — 81001 URINALYSIS AUTO W/SCOPE: CPT

## 2024-03-03 PROCEDURE — 74177 CT ABD & PELVIS W/CONTRAST: CPT

## 2024-03-03 PROCEDURE — 6360000004 HC RX CONTRAST MEDICATION: Performed by: STUDENT IN AN ORGANIZED HEALTH CARE EDUCATION/TRAINING PROGRAM

## 2024-03-03 RX ORDER — ONDANSETRON 4 MG/1
4 TABLET, ORALLY DISINTEGRATING ORAL ONCE
Status: COMPLETED | OUTPATIENT
Start: 2024-03-03 | End: 2024-03-03

## 2024-03-03 RX ADMIN — ONDANSETRON 4 MG: 4 TABLET, ORALLY DISINTEGRATING ORAL at 04:05

## 2024-03-03 RX ADMIN — IOPAMIDOL 100 ML: 755 INJECTION, SOLUTION INTRAVENOUS at 06:00

## 2024-03-03 ASSESSMENT — PAIN SCALES - GENERAL: PAINLEVEL_OUTOF10: 8

## 2024-03-03 ASSESSMENT — PAIN - FUNCTIONAL ASSESSMENT
PAIN_FUNCTIONAL_ASSESSMENT: 0-10
PAIN_FUNCTIONAL_ASSESSMENT: NONE - DENIES PAIN

## 2024-03-03 NOTE — ED NOTES
Pt voices understanding of all dc and follow up instructions given.  Pt amb to exit without difficulty.

## 2024-03-03 NOTE — ED TRIAGE NOTES
States lower abd pain since yesterday. States she had a normal bm yesterday but feels like she still needs to do more.

## 2024-03-03 NOTE — ED PROVIDER NOTES
[id number]  [phone]    Schedule an appointment as soon as possible for a visit       [facility] EMERG[facility] DEPT  [address] [place] [address]  [phone]  Go to   If symptoms worsen        DISCHARGE MEDICATIONS:     Medication List        ASK your doctor about these medications      atorvastatin 20 MG tablet  Commonly known as: LIPITOR     buPROPion 300 MG extended release tablet  Commonly known as: WELLBUTRIN XL     DULoxetine 60 MG extended release capsule  Commonly known as: CYMBALTA     Dupixent 300 MG/2ML Sopn injection  Generic drug: dupilumab     metoprolol 100 MG tablet  Commonly known as: LOPRESSOR                DISCONTINUED MEDICATIONS:  Discharge Medication List as of 3/3/2024  6:50 AM          I am the Primary Clinician of Record. Timmy Knight MD (electronically signed)    (Please note that parts of this dictation were completed with voice recognition software. Quite often unanticipated grammatical, syntax, homophones, and other interpretive errors are inadvertently transcribed by the computer software. Please disregards these errors. Please excuse any errors that have escaped final proofreading.)     Timmy Knight MD  03/05/24 8610

## 2024-03-03 NOTE — DISCHARGE INSTRUCTIONS
Albumin/Globulin Ratio 0.9 (L) 1.1 - 2.2     CBC with Auto Differential    Collection Time: 03/03/24  4:02 AM   Result Value Ref Range    WBC 8.3 3.6 - 11.0 K/uL    RBC 4.46 3.80 - 5.20 M/uL    Hemoglobin 12.1 11.5 - 16.0 g/dL    Hematocrit 38.8 35.0 - 47.0 %    MCV 87.0 80.0 - 99.0 FL    MCH 27.1 26.0 - 34.0 PG    MCHC 31.2 30.0 - 36.5 g/dL    RDW 14.5 11.5 - 14.5 %    Platelets 290 150 - 400 K/uL    MPV 9.6 8.9 - 12.9 FL    Nucleated RBCs 0.0 0.0  WBC    nRBC 0.00 0.00 - 0.01 K/uL    Neutrophils % 59 32 - 75 %    Lymphocytes % 30 12 - 49 %    Monocytes % 8 5 - 13 %    Eosinophils % 2 0 - 7 %    Basophils % 0 0 - 1 %    Immature Granulocytes 1 (H) 0 - 0.5 %    Neutrophils Absolute 5.0 1.8 - 8.0 K/UL    Lymphocytes Absolute 2.5 0.8 - 3.5 K/UL    Monocytes Absolute 0.6 0.0 - 1.0 K/UL    Eosinophils Absolute 0.1 0.0 - 0.4 K/UL    Basophils Absolute 0.0 0.0 - 0.1 K/UL    Absolute Immature Granulocyte 0.0 0.00 - 0.04 K/UL    Differential Type AUTOMATED     Lipase    Collection Time: 03/03/24  4:02 AM   Result Value Ref Range    Lipase 28 13 - 75 U/L       Radiologic Studies  CT ABDOMEN PELVIS W IV CONTRAST Additional Contrast? None   Final Result   No acute process. Remote gastric bypass.        ------------------------------------------------------------------------------------------------------------  The exam and treatment you received in the Emergency Department were for an urgent problem and are not intended as complete care. It is important that you follow-up with a doctor, nurse practitioner, or physician assistant to:  (1) confirm your diagnosis,  (2) re-evaluation of changes in your illness and treatment, and (3) for ongoing care. Please take your discharge instructions with you when you go to your follow-up appointment.     If you have any problem arranging a follow-up appointment, contact the Emergency Department.  If your symptoms become worse or you do not improve as expected and you are unable to

## 2024-03-15 ENCOUNTER — HOSPITAL ENCOUNTER (OUTPATIENT)
Facility: HOSPITAL | Age: 44
End: 2024-03-15
Attending: STUDENT IN AN ORGANIZED HEALTH CARE EDUCATION/TRAINING PROGRAM
Payer: COMMERCIAL

## 2024-03-15 DIAGNOSIS — H35.50 OPTIC ATROPHY ASSOCIATED WITH RETINAL DYSTROPHIES: ICD-10-CM

## 2024-03-15 DIAGNOSIS — H47.20 OPTIC ATROPHY ASSOCIATED WITH RETINAL DYSTROPHIES: ICD-10-CM

## 2024-03-15 PROCEDURE — 70553 MRI BRAIN STEM W/O & W/DYE: CPT

## 2024-03-15 PROCEDURE — 6360000004 HC RX CONTRAST MEDICATION: Performed by: STUDENT IN AN ORGANIZED HEALTH CARE EDUCATION/TRAINING PROGRAM

## 2024-03-15 PROCEDURE — A9577 INJ MULTIHANCE: HCPCS | Performed by: STUDENT IN AN ORGANIZED HEALTH CARE EDUCATION/TRAINING PROGRAM

## 2024-03-15 RX ADMIN — GADOBENATE DIMEGLUMINE 19 ML: 529 INJECTION, SOLUTION INTRAVENOUS at 17:10

## 2024-11-22 ENCOUNTER — HOSPITAL ENCOUNTER (OUTPATIENT)
Facility: HOSPITAL | Age: 44
Discharge: HOME | End: 2024-11-25
Payer: COMMERCIAL

## 2024-11-22 DIAGNOSIS — M26.633 ARTICULAR DISC DISORDER OF BOTH TEMPOROMANDIBULAR JOINTS: ICD-10-CM

## 2024-11-22 PROCEDURE — 70336 MAGNETIC IMAGE JAW JOINT: CPT

## (undated) DEVICE — SYSTEM CLOSURE 6-12 FR VEN VASC VASCADE MVP

## (undated) DEVICE — PINNACLE INTRODUCER SHEATH: Brand: PINNACLE

## (undated) DEVICE — REM POLYHESIVE ADULT PATIENT RETURN ELECTRODE: Brand: VALLEYLAB

## (undated) DEVICE — SUTURE VCRL SZ 0 L36IN ABSRB UD L40MM CT 1/2 CIR TAPERPOINT J958H

## (undated) DEVICE — MICROPUNCTURE, INTRODUCER SET SILHOUETTE, TRANSITIONLESS PUSH-PLUS DESIGN - STIFFENED CANNULA WITH NITINOL WIRE GUIDE: Brand: MICROPUNCTURE

## (undated) DEVICE — UNIVERSAL FIXATION CANNULA: Brand: VERSAONE

## (undated) DEVICE — 3000CC GUARDIAN II: Brand: GUARDIAN

## (undated) DEVICE — KENDALL SCD EXPRESS SLEEVES, KNEE LENGTH, MEDIUM: Brand: KENDALL SCD

## (undated) DEVICE — SHEARS ENDOSCP L36CM DIA5MM ULTRASONIC CRV TIP W/ ADV

## (undated) DEVICE — (D)PREP SKN CHLRAPRP APPL 26ML -- CONVERT TO ITEM 371833

## (undated) DEVICE — 3M™ WARMING BLANKET, UPPER BODY, 10 PER CASE, 42268: Brand: BAIR HUGGER™

## (undated) DEVICE — EVAC SMOKE SEECLEAR XCL -- SEE CLEAR

## (undated) DEVICE — INFECTION CONTROL KIT SYS

## (undated) DEVICE — Device

## (undated) DEVICE — BLADELESS OPTICAL TROCAR WITH FIXATION CANNULA: Brand: VERSAONE

## (undated) DEVICE — SOLUTION IV 1000ML 0.9% SOD CHL

## (undated) DEVICE — PATCH CARTO 3 EXT REF --

## (undated) DEVICE — SURGICAL PROCEDURE TRAY CRD CATH 3 PRT

## (undated) DEVICE — SYR 10ML LUER LOK 1/5ML GRAD --

## (undated) DEVICE — SUTURE SZ 0 27IN 5/8 CIR UR-6  TAPER PT VIOLET ABSRB VICRYL J603H

## (undated) DEVICE — INSTRMT SET WND CLSR SUT PASS --

## (undated) DEVICE — COVER PRB INTOP 96X6 IN LF

## (undated) DEVICE — STERILE POLYISOPRENE POWDER-FREE SURGICAL GLOVES: Brand: PROTEXIS

## (undated) DEVICE — TOWEL,OR,DSP,ST,BLUE,DLX,6/PK,12PK/CS: Brand: MEDLINE

## (undated) DEVICE — BLADELESS OPTICAL TROCAR WITH FIXATION CANNULA: Brand: VERSAPORT

## (undated) DEVICE — NEEDLE HYPO 22GA L1.5IN BLK S STL HUB POLYPR SHLD REG BVL

## (undated) DEVICE — PAD,SANITARY,11 IN,MAXI,N-STRL,IND WRAP: Brand: MEDLINE

## (undated) DEVICE — LIGHT HANDLE: Brand: DEVON

## (undated) DEVICE — MEDI-TRACE CADENCE ADULT, DEFIBRILLATION ELECTRODE -RTS  (10 PR/PK) - PHYSIO-CONTROL: Brand: MEDI-TRACE CADENCE

## (undated) DEVICE — VCARE MEDIUM, UTERINE MANIPULATOR, VAGINAL-CERVICAL-AHLUWALIA'S-RETRACTOR-ELEVATOR: Brand: VCARE

## (undated) DEVICE — TUBE SET IRR PUMP THERMALCOOL -- SMARTABLATE

## (undated) DEVICE — STERILE POLYISOPRENE POWDER-FREE SURGICAL GLOVES WITH EMOLLIENT COATING: Brand: PROTEXIS

## (undated) DEVICE — INSUFFLATION NEEDLE: Brand: SURGINEEDLE

## (undated) DEVICE — TRAY PREP DRY W/ PREM GLV 2 APPL 6 SPNG 2 UNDPD 1 OVERWRAP

## (undated) DEVICE — TRAY CATH OD16FR SIL URIN M STATLOK STBL DEV SURSTP

## (undated) DEVICE — SURGICAL PROCEDURE PACK GYN LAPAROSCOPY CUST SMH LF

## (undated) DEVICE — SUTURE MCRYL SZ 4-0 L27IN ABSRB UD L19MM PS-2 1/2 CIR PRIM Y426H

## (undated) DEVICE — CATHETER EP 7FR L115CM 2-8-2MM SPC TIP 2MM DF CRV ADV COMPR

## (undated) DEVICE — APPLICATOR BNDG 1MM ADH PREMIERPRO EXOFIN

## (undated) DEVICE — CATH BIDIR JCRV 8F 3.5X115MM -- THERMOCOOL SF

## (undated) DEVICE — DEVON™ KNEE AND BODY STRAP 60" X 3" (1.5 M X 7.6 CM): Brand: DEVON

## (undated) DEVICE — BAG SPEC RETRV 275ML 10ML DISPOSABLE RELIACATCH

## (undated) DEVICE — CATHETER DIAG 5FR 4 POLE HIS CRV WEBST